# Patient Record
Sex: FEMALE | Race: WHITE | NOT HISPANIC OR LATINO | Employment: OTHER | URBAN - METROPOLITAN AREA
[De-identification: names, ages, dates, MRNs, and addresses within clinical notes are randomized per-mention and may not be internally consistent; named-entity substitution may affect disease eponyms.]

---

## 2023-10-19 ENCOUNTER — APPOINTMENT (OUTPATIENT)
Dept: RADIOLOGY | Facility: HOSPITAL | Age: 78
DRG: 885 | End: 2023-10-19
Payer: MEDICARE

## 2023-10-19 ENCOUNTER — HOSPITAL ENCOUNTER (INPATIENT)
Facility: HOSPITAL | Age: 78
LOS: 3 days | Discharge: PSYCHIATRIC OR SUBSTANCE USE FACILITY | DRG: 885 | End: 2023-10-23
Attending: EMERGENCY MEDICINE | Admitting: INTERNAL MEDICINE
Payer: MEDICARE

## 2023-10-19 DIAGNOSIS — R41.82 ALTERED MENTAL STATUS, UNSPECIFIED ALTERED MENTAL STATUS TYPE: Primary | ICD-10-CM

## 2023-10-19 DIAGNOSIS — R79.89 ELEVATED TROPONIN: ICD-10-CM

## 2023-10-19 DIAGNOSIS — R94.31 ABNORMAL EKG: ICD-10-CM

## 2023-10-19 DIAGNOSIS — R06.02 SHORTNESS OF BREATH ON EXERTION: Chronic | ICD-10-CM

## 2023-10-19 LAB
ALBUMIN SERPL BCP-MCNC: 5 G/DL (ref 3.4–5)
ALP SERPL-CCNC: 81 U/L (ref 33–136)
ALT SERPL W P-5'-P-CCNC: 15 U/L (ref 7–45)
AMPHETAMINES UR QL SCN: NORMAL
ANION GAP SERPL CALC-SCNC: 18 MMOL/L (ref 10–20)
APAP SERPL-MCNC: <10 UG/ML
APPEARANCE UR: ABNORMAL
AST SERPL W P-5'-P-CCNC: 20 U/L (ref 9–39)
BARBITURATES UR QL SCN: NORMAL
BASOPHILS # BLD AUTO: 0.02 X10*3/UL (ref 0–0.1)
BASOPHILS NFR BLD AUTO: 0.3 %
BENZODIAZ UR QL SCN: NORMAL
BILIRUB SERPL-MCNC: 1 MG/DL (ref 0–1.2)
BILIRUB UR STRIP.AUTO-MCNC: NEGATIVE MG/DL
BUN SERPL-MCNC: 17 MG/DL (ref 6–23)
BZE UR QL SCN: NORMAL
CALCIUM SERPL-MCNC: 9.8 MG/DL (ref 8.6–10.3)
CANNABINOIDS UR QL SCN: NORMAL
CARDIAC TROPONIN I PNL SERPL HS: 33 NG/L (ref 0–13)
CARDIAC TROPONIN I PNL SERPL HS: 55 NG/L (ref 0–13)
CHLORIDE SERPL-SCNC: 100 MMOL/L (ref 98–107)
CO2 SERPL-SCNC: 25 MMOL/L (ref 21–32)
COLOR UR: YELLOW
CREAT SERPL-MCNC: 1.03 MG/DL (ref 0.5–1.05)
EOSINOPHIL # BLD AUTO: 0.02 X10*3/UL (ref 0–0.4)
EOSINOPHIL NFR BLD AUTO: 0.3 %
ERYTHROCYTE [DISTWIDTH] IN BLOOD BY AUTOMATED COUNT: 12 % (ref 11.5–14.5)
ETHANOL SERPL-MCNC: <10 MG/DL
FENTANYL+NORFENTANYL UR QL SCN: NORMAL
GFR SERPL CREATININE-BSD FRML MDRD: 55 ML/MIN/1.73M*2
GLUCOSE SERPL-MCNC: 122 MG/DL (ref 74–99)
GLUCOSE UR STRIP.AUTO-MCNC: NEGATIVE MG/DL
HCT VFR BLD AUTO: 44.2 % (ref 36–46)
HGB BLD-MCNC: 14.5 G/DL (ref 12–16)
HYALINE CASTS #/AREA URNS AUTO: ABNORMAL /LPF
IMM GRANULOCYTES # BLD AUTO: 0.01 X10*3/UL (ref 0–0.5)
IMM GRANULOCYTES NFR BLD AUTO: 0.1 % (ref 0–0.9)
KETONES UR STRIP.AUTO-MCNC: ABNORMAL MG/DL
LEUKOCYTE ESTERASE UR QL STRIP.AUTO: NEGATIVE
LYMPHOCYTES # BLD AUTO: 1.17 X10*3/UL (ref 0.8–3)
LYMPHOCYTES NFR BLD AUTO: 16.3 %
MAGNESIUM SERPL-MCNC: 2.39 MG/DL (ref 1.6–2.4)
MCH RBC QN AUTO: 27.8 PG (ref 26–34)
MCHC RBC AUTO-ENTMCNC: 32.8 G/DL (ref 32–36)
MCV RBC AUTO: 85 FL (ref 80–100)
MONOCYTES # BLD AUTO: 0.52 X10*3/UL (ref 0.05–0.8)
MONOCYTES NFR BLD AUTO: 7.3 %
NEUTROPHILS # BLD AUTO: 5.42 X10*3/UL (ref 1.6–5.5)
NEUTROPHILS NFR BLD AUTO: 75.7 %
NITRITE UR QL STRIP.AUTO: NEGATIVE
NRBC BLD-RTO: 0 /100 WBCS (ref 0–0)
OPIATES UR QL SCN: NORMAL
OXYCODONE+OXYMORPHONE UR QL SCN: NORMAL
PCP UR QL SCN: NORMAL
PH UR STRIP.AUTO: 6 [PH]
PLATELET # BLD AUTO: 198 X10*3/UL (ref 150–450)
PMV BLD AUTO: 9.7 FL (ref 7.5–11.5)
POTASSIUM SERPL-SCNC: 3.6 MMOL/L (ref 3.5–5.3)
PROT SERPL-MCNC: 7 G/DL (ref 6.4–8.2)
PROT UR STRIP.AUTO-MCNC: ABNORMAL MG/DL
RBC # BLD AUTO: 5.22 X10*6/UL (ref 4–5.2)
RBC # UR STRIP.AUTO: ABNORMAL /UL
RBC #/AREA URNS AUTO: >20 /HPF
SALICYLATES SERPL-MCNC: <3 MG/DL
SODIUM SERPL-SCNC: 139 MMOL/L (ref 136–145)
SP GR UR STRIP.AUTO: 1.02
TSH SERPL-ACNC: 3.68 MIU/L (ref 0.44–3.98)
UROBILINOGEN UR STRIP.AUTO-MCNC: <2 MG/DL
WBC # BLD AUTO: 7.2 X10*3/UL (ref 4.4–11.3)
WBC #/AREA URNS AUTO: ABNORMAL /HPF

## 2023-10-19 PROCEDURE — 2500000004 HC RX 250 GENERAL PHARMACY W/ HCPCS (ALT 636 FOR OP/ED): Performed by: EMERGENCY MEDICINE

## 2023-10-19 PROCEDURE — 71045 X-RAY EXAM CHEST 1 VIEW: CPT | Mod: FOREIGN READ | Performed by: RADIOLOGY

## 2023-10-19 PROCEDURE — 80053 COMPREHEN METABOLIC PANEL: CPT | Performed by: PHYSICIAN ASSISTANT

## 2023-10-19 PROCEDURE — 81001 URINALYSIS AUTO W/SCOPE: CPT | Performed by: PHYSICIAN ASSISTANT

## 2023-10-19 PROCEDURE — 70450 CT HEAD/BRAIN W/O DYE: CPT | Performed by: RADIOLOGY

## 2023-10-19 PROCEDURE — 84484 ASSAY OF TROPONIN QUANT: CPT | Performed by: PHYSICIAN ASSISTANT

## 2023-10-19 PROCEDURE — 80307 DRUG TEST PRSMV CHEM ANLYZR: CPT | Performed by: PHYSICIAN ASSISTANT

## 2023-10-19 PROCEDURE — 85025 COMPLETE CBC W/AUTO DIFF WBC: CPT | Performed by: PHYSICIAN ASSISTANT

## 2023-10-19 PROCEDURE — 36415 COLL VENOUS BLD VENIPUNCTURE: CPT | Performed by: PHYSICIAN ASSISTANT

## 2023-10-19 PROCEDURE — 84443 ASSAY THYROID STIM HORMONE: CPT | Performed by: PHYSICIAN ASSISTANT

## 2023-10-19 PROCEDURE — 83735 ASSAY OF MAGNESIUM: CPT | Performed by: EMERGENCY MEDICINE

## 2023-10-19 PROCEDURE — 99285 EMERGENCY DEPT VISIT HI MDM: CPT | Mod: 25 | Performed by: EMERGENCY MEDICINE

## 2023-10-19 PROCEDURE — 71045 X-RAY EXAM CHEST 1 VIEW: CPT | Mod: FR

## 2023-10-19 PROCEDURE — 36415 COLL VENOUS BLD VENIPUNCTURE: CPT

## 2023-10-19 PROCEDURE — 70450 CT HEAD/BRAIN W/O DYE: CPT | Mod: MG

## 2023-10-19 PROCEDURE — 99285 EMERGENCY DEPT VISIT HI MDM: CPT | Performed by: PHYSICIAN ASSISTANT

## 2023-10-19 PROCEDURE — 84484 ASSAY OF TROPONIN QUANT: CPT

## 2023-10-19 PROCEDURE — 2500000004 HC RX 250 GENERAL PHARMACY W/ HCPCS (ALT 636 FOR OP/ED)

## 2023-10-19 PROCEDURE — 80320 DRUG SCREEN QUANTALCOHOLS: CPT | Performed by: PHYSICIAN ASSISTANT

## 2023-10-19 PROCEDURE — 93010 ELECTROCARDIOGRAM REPORT: CPT | Performed by: INTERNAL MEDICINE

## 2023-10-19 PROCEDURE — 96372 THER/PROPH/DIAG INJ SC/IM: CPT

## 2023-10-19 PROCEDURE — 93010 ELECTROCARDIOGRAM REPORT: CPT | Performed by: EMERGENCY MEDICINE

## 2023-10-19 RX ORDER — LORAZEPAM 2 MG/ML
INJECTION INTRAMUSCULAR
Status: COMPLETED
Start: 2023-10-19 | End: 2023-10-19

## 2023-10-19 RX ORDER — HALOPERIDOL 5 MG/ML
2.5 INJECTION INTRAMUSCULAR ONCE
Status: COMPLETED | OUTPATIENT
Start: 2023-10-19 | End: 2023-10-19

## 2023-10-19 RX ORDER — HALOPERIDOL 5 MG/ML
2 INJECTION INTRAMUSCULAR ONCE
Status: DISCONTINUED | OUTPATIENT
Start: 2023-10-19 | End: 2023-10-19

## 2023-10-19 RX ORDER — NAPROXEN SODIUM 220 MG/1
324 TABLET, FILM COATED ORAL ONCE
Status: DISCONTINUED | OUTPATIENT
Start: 2023-10-19 | End: 2023-10-20

## 2023-10-19 RX ORDER — LORAZEPAM 1 MG/1
2 TABLET ORAL ONCE
Status: DISCONTINUED | OUTPATIENT
Start: 2023-10-19 | End: 2023-10-19

## 2023-10-19 RX ORDER — DIPHENHYDRAMINE HYDROCHLORIDE 50 MG/ML
12.5 INJECTION INTRAMUSCULAR; INTRAVENOUS ONCE
Status: DISCONTINUED | OUTPATIENT
Start: 2023-10-19 | End: 2023-10-19

## 2023-10-19 RX ORDER — HALOPERIDOL 5 MG/ML
2.5 INJECTION INTRAMUSCULAR ONCE
Status: DISCONTINUED | OUTPATIENT
Start: 2023-10-19 | End: 2023-10-19

## 2023-10-19 RX ORDER — HALOPERIDOL 5 MG/ML
INJECTION INTRAMUSCULAR
Status: COMPLETED
Start: 2023-10-19 | End: 2023-10-19

## 2023-10-19 RX ORDER — DIPHENHYDRAMINE HYDROCHLORIDE 50 MG/ML
25 INJECTION INTRAMUSCULAR; INTRAVENOUS ONCE
Status: COMPLETED | OUTPATIENT
Start: 2023-10-19 | End: 2023-10-19

## 2023-10-19 RX ORDER — LORAZEPAM 2 MG/ML
2 INJECTION INTRAMUSCULAR ONCE
Status: COMPLETED | OUTPATIENT
Start: 2023-10-19 | End: 2023-10-19

## 2023-10-19 RX ADMIN — HALOPERIDOL 2.5 MG: 5 INJECTION INTRAMUSCULAR at 18:38

## 2023-10-19 RX ADMIN — DIPHENHYDRAMINE HYDROCHLORIDE 25 MG: 50 INJECTION, SOLUTION INTRAMUSCULAR; INTRAVENOUS at 20:13

## 2023-10-19 RX ADMIN — LORAZEPAM 2 MG: 2 INJECTION INTRAMUSCULAR; INTRAVENOUS at 20:12

## 2023-10-19 RX ADMIN — LORAZEPAM 2 MG: 2 INJECTION INTRAMUSCULAR at 20:12

## 2023-10-19 RX ADMIN — HALOPERIDOL LACTATE 2.5 MG: 5 INJECTION, SOLUTION INTRAMUSCULAR at 18:38

## 2023-10-19 SDOH — HEALTH STABILITY: MENTAL HEALTH: SLEEP PATTERN: UNABLE TO ASSESS

## 2023-10-19 SDOH — SOCIAL STABILITY: SOCIAL INSECURITY: FAMILY BEHAVIORS: ANXIOUS

## 2023-10-19 SDOH — SOCIAL STABILITY: SOCIAL NETWORK: EMOTIONAL SUPPORT GIVEN: REASSURE

## 2023-10-19 SDOH — HEALTH STABILITY: MENTAL HEALTH: BEHAVIORS/MOOD: AGITATED;ANXIOUS;DEFIANT;DELUSIONS;HALLUCINATIONS;HOSTILE;IMPULSIVE;INAPPROPRIATE;IRRITABLE;RESTLESS

## 2023-10-19 SDOH — HEALTH STABILITY: MENTAL HEALTH: CONTENT: BLAMING OTHERS;OBSESSIONS

## 2023-10-19 SDOH — HEALTH STABILITY: MENTAL HEALTH: HAVE YOU EVER DONE ANYTHING, STARTED TO DO ANYTHING, OR PREPARED TO DO ANYTHING TO END YOUR LIFE?: NO

## 2023-10-19 SDOH — HEALTH STABILITY: MENTAL HEALTH: BEHAVIORS/MOOD: ANGRY;AGITATED;FLIGHT OF IDEAS;HYPER-VERBAL;INAPPROPRIATE;IMPULSIVE;IRRITABLE

## 2023-10-19 SDOH — SOCIAL STABILITY: SOCIAL NETWORK: PARENT/GUARDIAN/SIGNIFICANT OTHER INVOLVEMENT: SPORADIC INVOLVEMENT

## 2023-10-19 SDOH — HEALTH STABILITY: MENTAL HEALTH: BEHAVIORS/MOOD: FLAT AFFECT;SLEEPING

## 2023-10-19 SDOH — HEALTH STABILITY: MENTAL HEALTH: BEHAVIORS/MOOD: SLEEPING

## 2023-10-19 SDOH — HEALTH STABILITY: MENTAL HEALTH
BEHAVIORS/MOOD: AGITATED;AGGRESSIVE VERBALLY, OTHERS;DELUSIONS;HALLUCINATIONS;GUARDED;HOSTILE;HYPER-VERBAL;IMPULSIVE;PARANOID;RESTLESS

## 2023-10-19 SDOH — HEALTH STABILITY: MENTAL HEALTH: HAVE YOU ACTUALLY HAD ANY THOUGHTS OF KILLING YOURSELF?: NO

## 2023-10-19 SDOH — HEALTH STABILITY: MENTAL HEALTH: NEEDS EXPRESSED: DENIES

## 2023-10-19 SDOH — HEALTH STABILITY: MENTAL HEALTH: SUICIDE ASSESSMENT: ADULT (C-SSRS)

## 2023-10-19 SDOH — HEALTH STABILITY: MENTAL HEALTH: HAVE YOU WISHED YOU WERE DEAD OR WISHED YOU COULD GO TO SLEEP AND NOT WAKE UP?: NO

## 2023-10-19 SDOH — HEALTH STABILITY: MENTAL HEALTH: DELUSIONS: CONTROLLING

## 2023-10-19 ASSESSMENT — PAIN - FUNCTIONAL ASSESSMENT: PAIN_FUNCTIONAL_ASSESSMENT: 0-10

## 2023-10-19 ASSESSMENT — COLUMBIA-SUICIDE SEVERITY RATING SCALE - C-SSRS
1. IN THE PAST MONTH, HAVE YOU WISHED YOU WERE DEAD OR WISHED YOU COULD GO TO SLEEP AND NOT WAKE UP?: NO
6. HAVE YOU EVER DONE ANYTHING, STARTED TO DO ANYTHING, OR PREPARED TO DO ANYTHING TO END YOUR LIFE?: NO
2. HAVE YOU ACTUALLY HAD ANY THOUGHTS OF KILLING YOURSELF?: NO

## 2023-10-19 ASSESSMENT — LIFESTYLE VARIABLES
REASON UNABLE TO ASSESS: YES
REASON UNABLE TO ASSESS: YES

## 2023-10-19 NOTE — ED PROVIDER NOTES

## 2023-10-19 NOTE — ED PROVIDER NOTES
"HPI   Chief Complaint   Patient presents with    Altered Mental Status     Per EMS pt called the leasing office disoriented, normally alert and oriented. Pt states \"I know I'm not delusional I'm Kar's mother.\" Flight of ideas Unable to de-escalate pt, continuously shouting.        Patient is a 78-year-old female with history of stage III CKD, diverticulosis, JONA, hypertension, lichen sclerosis, major depressive disorder, recurrent UTIs, who is on Effexor and bupropion who presents with altered mental status and acute psychosis.  Apparently her leasing office at her apartment where she lives alone called the squad after patient called their office and was behaving erratically, they are familiar with this patient and she does not normally act like this.  Patient is a difficult historian, she is very tangential, has flight of ideas, is very hyper fixated on somebody named Kar who she tells me is her son but then states that he is 80 years old, then tells me it is her .  In her contact information Joel is listed as her .  She is very hyper fixated on whom he is dating and having sexual relations with, she is very difficult to obtain a history from.  She denies any chest pain, shortness of breath, abdominal pain, burning frequency or urgency with urination, denies headache or head trauma.  She is alert and oriented to self, she believes that we are at [facility] \"where she graduated from\".  She denies ingesting too much of her medications or any drugs or alcohol.                            Nirali Coma Scale Score: 14                  Patient History   Past Medical History:   Diagnosis Date    Hyperlipidemia, unspecified 09/23/2015    Dyslipidemia    Overweight 09/23/2015    Overweight and obesity    Personal history of other diseases of the circulatory system 09/23/2015    History of hypertension    Personal history of other diseases of the digestive system 09/23/2015    History of esophageal " reflux    Personal history of other endocrine, nutritional and metabolic disease 12/15/2015    History of obesity    Stress incontinence (female) (male) 09/23/2015    Stress incontinence, female     No past surgical history on file.  No family history on file.  Social History     Tobacco Use    Smoking status: Not on file    Smokeless tobacco: Not on file   Substance Use Topics    Alcohol use: Not on file    Drug use: Not on file       Physical Exam   ED Triage Vitals [10/19/23 1810]   Temp Heart Rate Resp BP   36.7 °C (98.1 °F) 60 20 142/88      SpO2 Temp Source Heart Rate Source Patient Position   94 % Temporal -- --      BP Location FiO2 (%)     -- --       Physical Exam  Vitals and nursing note reviewed.   Constitutional:       General: She is not in acute distress.     Appearance: Normal appearance. She is not toxic-appearing.      Comments: Erratic, yelling loudly in ED, tangential, sexually hyperfixated   HENT:      Head: Normocephalic and atraumatic.      Nose: Nose normal.   Eyes:      General: No visual field deficit.     Extraocular Movements: Extraocular movements intact.      Pupils: Pupils are equal, round, and reactive to light.   Cardiovascular:      Rate and Rhythm: Normal rate and regular rhythm.   Pulmonary:      Effort: Pulmonary effort is normal.      Breath sounds: Normal breath sounds.   Abdominal:      Palpations: Abdomen is soft.      Tenderness: There is no abdominal tenderness. There is no guarding.   Musculoskeletal:         General: Normal range of motion.      Cervical back: Normal range of motion and neck supple.   Skin:     General: Skin is warm and dry.   Neurological:      General: No focal deficit present.      Mental Status: She is alert and oriented to person, place, and time.      GCS: GCS eye subscore is 4. GCS verbal subscore is 5. GCS motor subscore is 6.      Cranial Nerves: No cranial nerve deficit, dysarthria or facial asymmetry.      Sensory: No sensory deficit.    Psychiatric:         Mood and Affect: Mood normal.         Thought Content: Thought content normal.       Labs Reviewed   CBC WITH AUTO DIFFERENTIAL - Abnormal       Result Value    WBC 7.2      nRBC 0.0      RBC 5.22 (*)     Hemoglobin 14.5      Hematocrit 44.2      MCV 85      MCH 27.8      MCHC 32.8      RDW 12.0      Platelets 198      MPV 9.7      Neutrophils % 75.7      Immature Granulocytes %, Automated 0.1      Lymphocytes % 16.3      Monocytes % 7.3      Eosinophils % 0.3      Basophils % 0.3      Neutrophils Absolute 5.42      Immature Granulocytes Absolute, Automated 0.01      Lymphocytes Absolute 1.17      Monocytes Absolute 0.52      Eosinophils Absolute 0.02      Basophils Absolute 0.02     COMPREHENSIVE METABOLIC PANEL - Abnormal    Glucose 122 (*)     Sodium 139      Potassium 3.6      Chloride 100      Bicarbonate 25      Anion Gap 18      Urea Nitrogen 17      Creatinine 1.03      eGFR 55 (*)     Calcium 9.8      Albumin 5.0      Alkaline Phosphatase 81      Total Protein 7.0      AST 20      Bilirubin, Total 1.0      ALT 15     TSH WITH REFLEX TO FREE T4 IF ABNORMAL - Normal    Thyroid Stimulating Hormone 3.68      Narrative:     TSH testing is performed using different testing methodology at [facility] than at other [facility]. Direct result comparisons should only be made within the same method.     ACUTE TOXICOLOGY PANEL, BLOOD - Normal    Acetaminophen <10.0      Salicylate  <3      Alcohol <10     MAGNESIUM - Normal    Magnesium 2.39     URINALYSIS WITH REFLEX MICROSCOPIC AND CULTURE    Narrative:     The following orders were created for panel order Urinalysis with Reflex Microscopic and Culture.  Procedure                               Abnormality         Status                     ---------                               -----------         ------                     Urinalysis with Reflex M...[[id number]]                                                 Extra Urine Murphy  Tube[[id number]]                                                         Please view results for these tests on the individual orders.   DRUG SCREEN,URINE   URINALYSIS WITH REFLEX MICROSCOPIC AND CULTURE   EXTRA URINE GRAY TUBE   TROPONIN SERIES- (INITIAL, 1 HR)    Narrative:     The following orders were created for panel order Troponin Series, (0, 1 HR).  Procedure                               Abnormality         Status                     ---------                               -----------         ------                     Troponin I, High Sensiti...[[id number]]                      In process                   Please view results for these tests on the individual orders.   SERIAL TROPONIN-INITIAL   POCT GLUCOSE METER     XR chest 1 view   Final Result   No findings of an acute cardiopulmonary process.   Signed by Reymundo Fierro MD      CT head wo IV contrast   Final Result   No acute intracranial hemorrhage or mass effect.        Mild nonspecific white matter changes and parenchymal volume loss.             MACRO:   None.        Signed by: Janna Hayes 10/19/2023 7:13 PM   Dictation workstation:   [id number]            ED Course & MDM   ED Course as of 10/19/23 2117   Thu Oct 19, 2023   1814 Attempted to contact patient's  Joel for collateral information, left VM after no answer. [MK]      ED Course User Index  [MK] Sylwia Eddy PA-C       Medical Decision Making    MDM: Patient is a 78-year-old female who presents with altered mental status and acute psychosis, per review of her records and her PCPs most recent office visit, it does appear that she suffers from some depression but there is no indication that patient acts like this at baseline, I attempted to call Joel, her  and was unable to speak to him, left a voicemail requesting a call back.  I do have concerns for infection, intracranial process, UTI, etc. and for this reason altered mental status work-up was initiated, we did  obtain an EKG which showed a mild QTc prolongation, Haldol was given prior to EKG being done, at which point we discontinued any additional QT prolonging medications, cardiac monitor was ordered and patient was given 12.5 of Benadryl and 2 of Ativan given that she was disturbing others in the ED with her yet loud yelling and hyperfixation on sexual topics.  Patient CMP showed a glucose of 122, normal creatinine, GFR 55, TSH within normal limits, magnesium 2.39, tox panel negative, CBC without leukocytosis, hemoglobin 14.5, EKG interpreted by ED attending, did show mild QTc prolongation, CT head is negative chest x-ray negative, patient acutely agitated in the ED, will initially was given Haldol prior to obtaining EKG, at which point Ativan and Benadryl was given.    EKG was read immediately after being performed,, normal sinus rhythm with a rate of 98, she did have a QTc prolongation of 487, there are T wave inversions in V1 V2 V3 in the setting of a right bundle branch block, left axis deviation, there is no prior EKG for comparison, patient not complaining of any chest pain or shortness of breath but troponins were added on.  Troponin and urinalysis currently pending, high clinical suspicion of a UTI causing her symptoms, at her age without a history of psychosis, likely metabolic/medical in nature.  Anticipate admission, patient will be signed out to incoming resident Anton Berger.          Procedure  Procedures     Sylwia Eddy PA-C  10/19/23 9153

## 2023-10-19 NOTE — PROGRESS NOTES

## 2023-10-20 ENCOUNTER — APPOINTMENT (OUTPATIENT)
Dept: CARDIOLOGY | Facility: HOSPITAL | Age: 78
DRG: 885 | End: 2023-10-20
Payer: MEDICARE

## 2023-10-20 PROBLEM — R06.02 SHORTNESS OF BREATH ON EXERTION: Chronic | Status: ACTIVE | Noted: 2023-10-20

## 2023-10-20 PROBLEM — R41.82 ALTERED MENTAL STATUS, UNSPECIFIED ALTERED MENTAL STATUS TYPE: Status: ACTIVE | Noted: 2023-10-20

## 2023-10-20 PROBLEM — R79.89 ELEVATED TROPONIN: Status: ACTIVE | Noted: 2023-10-20

## 2023-10-20 PROBLEM — R94.31 ABNORMAL EKG: Status: ACTIVE | Noted: 2023-10-20

## 2023-10-20 LAB
AMMONIA PLAS-SCNC: 32 UMOL/L (ref 16–53)
ANION GAP SERPL CALC-SCNC: 14 MMOL/L (ref 10–20)
ATRIAL RATE: 95 BPM
BUN SERPL-MCNC: 24 MG/DL (ref 6–23)
CALCIUM SERPL-MCNC: 9.8 MG/DL (ref 8.6–10.3)
CARDIAC TROPONIN I PNL SERPL HS: 45 NG/L (ref 0–13)
CHLORIDE SERPL-SCNC: 103 MMOL/L (ref 98–107)
CO2 SERPL-SCNC: 26 MMOL/L (ref 21–32)
CREAT SERPL-MCNC: 1.07 MG/DL (ref 0.5–1.05)
ERYTHROCYTE [DISTWIDTH] IN BLOOD BY AUTOMATED COUNT: 12.3 % (ref 11.5–14.5)
GFR SERPL CREATININE-BSD FRML MDRD: 53 ML/MIN/1.73M*2
GLUCOSE SERPL-MCNC: 109 MG/DL (ref 74–99)
HCT VFR BLD AUTO: 43.1 % (ref 36–46)
HGB BLD-MCNC: 14 G/DL (ref 12–16)
HOLD SPECIMEN: NORMAL
MAGNESIUM SERPL-MCNC: 2.49 MG/DL (ref 1.6–2.4)
MCH RBC QN AUTO: 27.7 PG (ref 26–34)
MCHC RBC AUTO-ENTMCNC: 32.5 G/DL (ref 32–36)
MCV RBC AUTO: 85 FL (ref 80–100)
NRBC BLD-RTO: 0 /100 WBCS (ref 0–0)
P AXIS: 41 DEGREES
P OFFSET: 202 MS
P ONSET: 128 MS
PLATELET # BLD AUTO: 229 X10*3/UL (ref 150–450)
PMV BLD AUTO: 9.6 FL (ref 7.5–11.5)
POTASSIUM SERPL-SCNC: 3.9 MMOL/L (ref 3.5–5.3)
PR INTERVAL: 162 MS
Q ONSET: 209 MS
QRS COUNT: 15 BEATS
QRS DURATION: 110 MS
QT INTERVAL: 390 MS
QTC CALCULATION(BAZETT): 490 MS
QTC FREDERICIA: 454 MS
R AXIS: -65 DEGREES
RBC # BLD AUTO: 5.05 X10*6/UL (ref 4–5.2)
SODIUM SERPL-SCNC: 139 MMOL/L (ref 136–145)
T AXIS: 3 DEGREES
T OFFSET: 404 MS
VENTRICULAR RATE: 95 BPM
WBC # BLD AUTO: 8.2 X10*3/UL (ref 4.4–11.3)

## 2023-10-20 PROCEDURE — 36415 COLL VENOUS BLD VENIPUNCTURE: CPT | Performed by: PHYSICIAN ASSISTANT

## 2023-10-20 PROCEDURE — 85027 COMPLETE CBC AUTOMATED: CPT | Performed by: NURSE PRACTITIONER

## 2023-10-20 PROCEDURE — 80048 BASIC METABOLIC PNL TOTAL CA: CPT | Performed by: NURSE PRACTITIONER

## 2023-10-20 PROCEDURE — 93010 ELECTROCARDIOGRAM REPORT: CPT | Performed by: INTERNAL MEDICINE

## 2023-10-20 PROCEDURE — 99232 SBSQ HOSP IP/OBS MODERATE 35: CPT | Performed by: NURSE PRACTITIONER

## 2023-10-20 PROCEDURE — 84484 ASSAY OF TROPONIN QUANT: CPT | Performed by: PHYSICIAN ASSISTANT

## 2023-10-20 PROCEDURE — 99221 1ST HOSP IP/OBS SF/LOW 40: CPT | Performed by: PSYCHIATRY & NEUROLOGY

## 2023-10-20 PROCEDURE — 93005 ELECTROCARDIOGRAM TRACING: CPT

## 2023-10-20 PROCEDURE — 99222 1ST HOSP IP/OBS MODERATE 55: CPT | Performed by: INTERNAL MEDICINE

## 2023-10-20 PROCEDURE — 2500000004 HC RX 250 GENERAL PHARMACY W/ HCPCS (ALT 636 FOR OP/ED): Performed by: NURSE PRACTITIONER

## 2023-10-20 PROCEDURE — 2500000001 HC RX 250 WO HCPCS SELF ADMINISTERED DRUGS (ALT 637 FOR MEDICARE OP): Performed by: PHYSICIAN ASSISTANT

## 2023-10-20 PROCEDURE — 2500000001 HC RX 250 WO HCPCS SELF ADMINISTERED DRUGS (ALT 637 FOR MEDICARE OP): Performed by: NURSE PRACTITIONER

## 2023-10-20 PROCEDURE — 2060000001 HC INTERMEDIATE ICU ROOM DAILY

## 2023-10-20 PROCEDURE — 82140 ASSAY OF AMMONIA: CPT | Performed by: PHYSICIAN ASSISTANT

## 2023-10-20 PROCEDURE — 83735 ASSAY OF MAGNESIUM: CPT | Performed by: NURSE PRACTITIONER

## 2023-10-20 PROCEDURE — 36415 COLL VENOUS BLD VENIPUNCTURE: CPT | Performed by: NURSE PRACTITIONER

## 2023-10-20 RX ORDER — ACETAMINOPHEN 325 MG/1
650 TABLET ORAL EVERY 4 HOURS PRN
Status: DISCONTINUED | OUTPATIENT
Start: 2023-10-20 | End: 2023-10-24 | Stop reason: HOSPADM

## 2023-10-20 RX ORDER — ICOSAPENT ETHYL 1 G/1
1 CAPSULE ORAL DAILY
Status: DISCONTINUED | OUTPATIENT
Start: 2023-10-20 | End: 2023-10-24 | Stop reason: HOSPADM

## 2023-10-20 RX ORDER — METOPROLOL SUCCINATE 25 MG/1
25 TABLET, EXTENDED RELEASE ORAL 2 TIMES DAILY
COMMUNITY

## 2023-10-20 RX ORDER — AMLODIPINE BESYLATE 10 MG/1
10 TABLET ORAL DAILY
COMMUNITY

## 2023-10-20 RX ORDER — ESTRADIOL 0.1 MG/G
2 CREAM VAGINAL 2 TIMES WEEKLY
COMMUNITY

## 2023-10-20 RX ORDER — ENOXAPARIN SODIUM 100 MG/ML
40 INJECTION SUBCUTANEOUS DAILY
Status: DISCONTINUED | OUTPATIENT
Start: 2023-10-20 | End: 2023-10-24 | Stop reason: HOSPADM

## 2023-10-20 RX ORDER — ROSUVASTATIN CALCIUM 10 MG/1
10 TABLET, COATED ORAL DAILY
COMMUNITY

## 2023-10-20 RX ORDER — POLYETHYLENE GLYCOL 3350 17 G/17G
17 POWDER, FOR SOLUTION ORAL DAILY
Status: DISCONTINUED | OUTPATIENT
Start: 2023-10-20 | End: 2023-10-24 | Stop reason: HOSPADM

## 2023-10-20 RX ORDER — BUPROPION HYDROCHLORIDE 150 MG/1
300 TABLET ORAL DAILY
Status: DISCONTINUED | OUTPATIENT
Start: 2023-10-20 | End: 2023-10-21

## 2023-10-20 RX ORDER — GLUCOSAM/CHONDRO/HERB 149/HYAL 750-100 MG
1 TABLET ORAL DAILY
COMMUNITY

## 2023-10-20 RX ORDER — TALC
3 POWDER (GRAM) TOPICAL DAILY
Status: DISCONTINUED | OUTPATIENT
Start: 2023-10-20 | End: 2023-10-24 | Stop reason: HOSPADM

## 2023-10-20 RX ORDER — ACETAMINOPHEN 160 MG/5ML
650 SOLUTION ORAL EVERY 4 HOURS PRN
Status: DISCONTINUED | OUTPATIENT
Start: 2023-10-20 | End: 2023-10-24 | Stop reason: HOSPADM

## 2023-10-20 RX ORDER — AMLODIPINE BESYLATE 10 MG/1
10 TABLET ORAL DAILY
Status: DISCONTINUED | OUTPATIENT
Start: 2023-10-20 | End: 2023-10-24 | Stop reason: HOSPADM

## 2023-10-20 RX ORDER — METOPROLOL SUCCINATE 25 MG/1
25 TABLET, EXTENDED RELEASE ORAL 2 TIMES DAILY
Status: DISCONTINUED | OUTPATIENT
Start: 2023-10-20 | End: 2023-10-24 | Stop reason: HOSPADM

## 2023-10-20 RX ORDER — ACETAMINOPHEN 650 MG/1
650 SUPPOSITORY RECTAL EVERY 4 HOURS PRN
Status: DISCONTINUED | OUTPATIENT
Start: 2023-10-20 | End: 2023-10-24 | Stop reason: HOSPADM

## 2023-10-20 RX ORDER — ROSUVASTATIN CALCIUM 10 MG/1
10 TABLET, COATED ORAL NIGHTLY
Status: DISCONTINUED | OUTPATIENT
Start: 2023-10-20 | End: 2023-10-24 | Stop reason: HOSPADM

## 2023-10-20 RX ORDER — CRANBERRY FRUIT 450 MG
1 TABLET ORAL DAILY
COMMUNITY

## 2023-10-20 RX ORDER — VENLAFAXINE HYDROCHLORIDE 37.5 MG/1
37.5 CAPSULE, EXTENDED RELEASE ORAL DAILY
COMMUNITY
End: 2023-10-24 | Stop reason: HOSPADM

## 2023-10-20 RX ORDER — VENLAFAXINE HYDROCHLORIDE 37.5 MG/1
37.5 CAPSULE, EXTENDED RELEASE ORAL DAILY
Status: DISCONTINUED | OUTPATIENT
Start: 2023-10-20 | End: 2023-10-21

## 2023-10-20 RX ORDER — BUPROPION HYDROCHLORIDE 300 MG/1
300 TABLET ORAL DAILY
COMMUNITY
End: 2023-10-24 | Stop reason: HOSPADM

## 2023-10-20 RX ADMIN — METOPROLOL SUCCINATE 25 MG: 25 TABLET, EXTENDED RELEASE ORAL at 22:10

## 2023-10-20 RX ADMIN — AMLODIPINE BESYLATE 10 MG: 10 TABLET ORAL at 22:09

## 2023-10-20 RX ADMIN — BUPROPION HYDROCHLORIDE 300 MG: 150 TABLET, FILM COATED, EXTENDED RELEASE ORAL at 22:10

## 2023-10-20 RX ADMIN — ROSUVASTATIN CALCIUM 10 MG: 10 TABLET, FILM COATED ORAL at 22:12

## 2023-10-20 RX ADMIN — Medication 3 MG: at 22:10

## 2023-10-20 SDOH — SOCIAL STABILITY: SOCIAL INSECURITY: POSSIBLE ABUSE REPORTED TO:: OTHER (COMMENT)

## 2023-10-20 SDOH — SOCIAL STABILITY: SOCIAL NETWORK: EMOTIONAL SUPPORT GIVEN: REASSURE

## 2023-10-20 SDOH — SOCIAL STABILITY: SOCIAL INSECURITY: FAMILY BEHAVIORS: OTHER (COMMENT);UNABLE TO ASSESS

## 2023-10-20 SDOH — SOCIAL STABILITY: SOCIAL NETWORK: PARENT/GUARDIAN/SIGNIFICANT OTHER INVOLVEMENT: SPORADIC INVOLVEMENT

## 2023-10-20 SDOH — SOCIAL STABILITY: SOCIAL INSECURITY: HAVE YOU HAD THOUGHTS OF HARMING ANYONE ELSE?: NO

## 2023-10-20 SDOH — HEALTH STABILITY: MENTAL HEALTH: BEHAVIORS/MOOD: SLEEPING

## 2023-10-20 SDOH — SOCIAL STABILITY: SOCIAL INSECURITY: DOES ANYONE TRY TO KEEP YOU FROM HAVING/CONTACTING OTHER FRIENDS OR DOING THINGS OUTSIDE YOUR HOME?: YES

## 2023-10-20 SDOH — HEALTH STABILITY: MENTAL HEALTH: HALLUCINATION: UNABLE TO ASSESS

## 2023-10-20 SDOH — SOCIAL STABILITY: SOCIAL INSECURITY: ABUSE: ADULT

## 2023-10-20 SDOH — HEALTH STABILITY: MENTAL HEALTH: NEEDS EXPRESSED: DENIES

## 2023-10-20 SDOH — SOCIAL STABILITY: SOCIAL INSECURITY: DO YOU FEEL ANYONE HAS EXPLOITED OR TAKEN ADVANTAGE OF YOU FINANCIALLY OR OF YOUR PERSONAL PROPERTY?: NO

## 2023-10-20 SDOH — SOCIAL STABILITY: SOCIAL INSECURITY: ARE THERE ANY APPARENT SIGNS OF INJURIES/BEHAVIORS THAT COULD BE RELATED TO ABUSE/NEGLECT?: NO

## 2023-10-20 SDOH — SOCIAL STABILITY: SOCIAL INSECURITY: ARE YOU OR HAVE YOU BEEN THREATENED OR ABUSED PHYSICALLY, EMOTIONALLY, OR SEXUALLY BY ANYONE?: YES

## 2023-10-20 SDOH — SOCIAL STABILITY: SOCIAL INSECURITY: HAS ANYONE EVER THREATENED TO HURT YOUR FAMILY OR YOUR PETS?: NO

## 2023-10-20 SDOH — HEALTH STABILITY: MENTAL HEALTH: CONTENT: UNABLE TO ASSESS

## 2023-10-20 SDOH — SOCIAL STABILITY: SOCIAL NETWORK: VISITOR BEHAVIORS: UNABLE TO ASSESS

## 2023-10-20 SDOH — SOCIAL STABILITY: SOCIAL INSECURITY: DO YOU FEEL UNSAFE GOING BACK TO THE PLACE WHERE YOU ARE LIVING?: YES

## 2023-10-20 ASSESSMENT — COGNITIVE AND FUNCTIONAL STATUS - GENERAL
TURNING FROM BACK TO SIDE WHILE IN FLAT BAD: A LITTLE
DAILY ACTIVITIY SCORE: 18
MOVING FROM LYING ON BACK TO SITTING ON SIDE OF FLAT BED WITH BEDRAILS: A LITTLE
EATING MEALS: A LITTLE
DRESSING REGULAR LOWER BODY CLOTHING: A LITTLE
PATIENT BASELINE BEDBOUND: NO
PERSONAL GROOMING: A LITTLE
MOBILITY SCORE: 18
DRESSING REGULAR UPPER BODY CLOTHING: A LITTLE
MOVING TO AND FROM BED TO CHAIR: A LITTLE
HELP NEEDED FOR BATHING: A LITTLE
MOBILITY SCORE: 24
CLIMB 3 TO 5 STEPS WITH RAILING: A LITTLE
TOILETING: A LITTLE
STANDING UP FROM CHAIR USING ARMS: A LITTLE
DAILY ACTIVITIY SCORE: 24
WALKING IN HOSPITAL ROOM: A LITTLE

## 2023-10-20 ASSESSMENT — ENCOUNTER SYMPTOMS
DYSPHORIC MOOD: 1
NERVOUS/ANXIOUS: 1
AGITATION: 0
SORE THROAT: 0
SHORTNESS OF BREATH: 0
ANAL BLEEDING: 0
NAUSEA: 0
ABDOMINAL DISTENTION: 0
SLEEP DISTURBANCE: 1
FEVER: 0
HALLUCINATIONS: 0
ABDOMINAL PAIN: 0
COUGH: 0
SHORTNESS OF BREATH: 1
HEADACHES: 0
CHEST TIGHTNESS: 0
FATIGUE: 1
FREQUENCY: 0
COLOR CHANGE: 0
CHILLS: 0
EYE REDNESS: 0
CHOKING: 0
CONSTIPATION: 1
BLOOD IN STOOL: 0
ALLERGIC/IMMUNOLOGIC NEGATIVE: 1
CONSTIPATION: 0
DIAPHORESIS: 0
BRUISES/BLEEDS EASILY: 1
ACTIVITY CHANGE: 0
UNEXPECTED WEIGHT CHANGE: 0
ADENOPATHY: 0
EYES NEGATIVE: 1
DYSURIA: 1
FREQUENCY: 1
DIZZINESS: 0
WHEEZING: 0
NUMBNESS: 0
SPEECH DIFFICULTY: 0
STRIDOR: 0
ARTHRALGIAS: 1
PALPITATIONS: 0

## 2023-10-20 ASSESSMENT — LIFESTYLE VARIABLES
EVER FELT BAD OR GUILTY ABOUT YOUR DRINKING: NO
EVER HAD A DRINK FIRST THING IN THE MORNING TO STEADY YOUR NERVES TO GET RID OF A HANGOVER: NO
HAVE YOU EVER FELT YOU SHOULD CUT DOWN ON YOUR DRINKING: NO
AUDIT-C TOTAL SCORE: 0
HOW MANY STANDARD DRINKS CONTAINING ALCOHOL DO YOU HAVE ON A TYPICAL DAY: PATIENT DOES NOT DRINK
REASON UNABLE TO ASSESS: NO
SUBSTANCE_ABUSE_PAST_12_MONTHS: NO
PRESCIPTION_ABUSE_PAST_12_MONTHS: NO
HOW OFTEN DO YOU HAVE 6 OR MORE DRINKS ON ONE OCCASION: NEVER
SKIP TO QUESTIONS 9-10: 1
AUDIT-C TOTAL SCORE: 0
HOW OFTEN DO YOU HAVE A DRINK CONTAINING ALCOHOL: NEVER
HAVE PEOPLE ANNOYED YOU BY CRITICIZING YOUR DRINKING: NO

## 2023-10-20 ASSESSMENT — PATIENT HEALTH QUESTIONNAIRE - PHQ9
SUM OF ALL RESPONSES TO PHQ9 QUESTIONS 1 & 2: 3
1. LITTLE INTEREST OR PLEASURE IN DOING THINGS: SEVERAL DAYS
2. FEELING DOWN, DEPRESSED OR HOPELESS: MORE THAN HALF THE DAYS

## 2023-10-20 ASSESSMENT — ACTIVITIES OF DAILY LIVING (ADL)
ADEQUATE_TO_COMPLETE_ADL: YES
BATHING: INDEPENDENT
WALKS IN HOME: INDEPENDENT
TOILETING: INDEPENDENT
HEARING - RIGHT EAR: FUNCTIONAL
GROOMING: INDEPENDENT
JUDGMENT_ADEQUATE_SAFELY_COMPLETE_DAILY_ACTIVITIES: YES
LACK_OF_TRANSPORTATION: NO
PATIENT'S MEMORY ADEQUATE TO SAFELY COMPLETE DAILY ACTIVITIES?: YES
DRESSING YOURSELF: INDEPENDENT
HEARING - LEFT EAR: FUNCTIONAL
FEEDING YOURSELF: INDEPENDENT

## 2023-10-20 ASSESSMENT — PAIN SCALES - GENERAL
PAINLEVEL_OUTOF10: 0 - NO PAIN
PAINLEVEL_OUTOF10: 0 - NO PAIN

## 2023-10-20 NOTE — ED NOTES
Patient calm and cooperative with care; patient able to swallow water with no issues; sitter at bedside; no needs voiced at this time.     Laila Jean RN  10/20/23 1538

## 2023-10-20 NOTE — CONSULTS
Consults  Cardiology Consult Note      Date:   10/20/2023  Patient name:  Charisse Acharya  Date of admission:  10/19/2023  5:59 PM  MRN:   [id number]  YOB: 1945  Time of Consult:  7:20 PM    Consulting Cardiologist: Dr. Jordana Pérez        Primary Cardiologist:   none     Referring Provider: Dr. Devonte Wheeler      Admission Diagnosis:     Altered mental status, unspecified altered mental status type    History of Present Illness:      Charisse Acharya is a 78 y.o.  female patient who is being at the request of Dr. Wheeler for inpatient consultation of  abnormal EKG/mildly elevated troponin . She was admitted on 10/19/2023.  Previous [facility] records have been reviewed in detail.      Patient was brought by ambulance for altered mental status. The patients son is currently at the bedside and states that the patient is now back to her normal level of mentation.  Both the patient and the family are very vocal about her levels of stress and anxiety and difficulty she is experiencing with her  who is at a extended care facility which is a [facility] on hospice care.  He is supposedly creating issues at his extended care facility and she is having difficulty dealing with that as he is calling her multiple times a day and sometimes in the middle of the night.  Her [facility] primary care physician has seen her recently and has been treating her with for depression and generalized anxiety disorder for a long period of time.    The patient has no documented known coronary artery disease.  She admits she has had an EKG in the past but has not had one for many years.  She denies any chest discomfort, heaviness tightness or pressure in the chest.  She does have shortness of breath on exertion when she walks fast when she is in a hurry or when she walks for very long distances.  She also has some feelings of dyspnea when she feels anxious or upset.  She has no orthopnea or PND and  has no lower extremity edema.  She has no palpitations, syncope or near syncope.  She states that her primary care physician has tried to have her get a stress test for many years but she has declined to do so.  She has never been told she has an abnormal EKG, that she has heart failure or coronary artery disease.    Her medical problems include hypertension, hypertriglyceridemia which is familial, generalized anxiety disorder and depression, stress incontinence with urinary tract infections.  Family who are present have concerns about her following directions post discharge and appropriate work-up.  In dealing with the chronic stressors in her life related to her 's situation.  Allergies:     No Known Allergies    Past Medical History:     Past Medical History:   Diagnosis Date    Anxiety     CAD (coronary artery disease)     CKD (chronic kidney disease) stage 3, GFR 30-59 ml/min (CMS/[facility])     Depression     Hyperlipidemia, unspecified 09/23/2015    Dyslipidemia    Hypertension     Overweight 09/23/2015    Overweight and obesity    Personal history of other diseases of the circulatory system 09/23/2015    History of hypertension    Personal history of other diseases of the digestive system 09/23/2015    History of esophageal reflux    Personal history of other endocrine, nutritional and metabolic disease 12/15/2015    History of obesity    Stress incontinence (female) (male) 09/23/2015    Stress incontinence, female    Urinary tract infection        Past Surgical History:     Past Surgical History:   Procedure Laterality Date    CATARACT EXTRACTION      HYSTERECTOMY      KNEE SURGERY         Family History:     Family History   Problem Relation Name Age of Onset    Hypertension Father         Social History:     Social History     Socioeconomic History    Marital status:      Spouse name: None    Number of children: None    Years of education: None    Highest education level: None   Occupational  History    None   Tobacco Use    Smoking status: Never    Smokeless tobacco: Never   Substance and Sexual Activity    Alcohol use: Never    Drug use: Never    Sexual activity: None   Other Topics Concern    None   Social History Narrative    None     Social Determinants of Health     Financial Resource Strain: Not on file   Food Insecurity: Not on file   Transportation Needs: Not on file   Physical Activity: Not on file   Stress: Not on file   Social Connections: Not on file   Intimate Partner Violence: Not on file   Housing Stability: Not on file       Home Medications:     Prior to Admission medications    Medication Sig Start Date End Date Taking? Authorizing Provider   amLODIPine (Norvasc) 10 mg tablet Take 1 tablet (10 mg) by mouth once daily.    Historical Provider, MD   buPROPion XL (Wellbutrin XL) 300 mg 24 hr tablet Take 1 tablet (300 mg) by mouth once daily. Do not crush, chew, or split.    Historical Provider, MD   cranberry fruit (cranberry) 450 mg tablet Take 1 tablet by mouth once daily.    Historical Provider, MD   estradiol (Estrace) 0.01 % (0.1 mg/gram) vaginal cream Insert 2 g into the vagina 2 times a week.    Historical Provider, MD   metoprolol succinate XL (Toprol-XL) 25 mg 24 hr tablet Take 1 tablet (25 mg) by mouth 2 times a day. Do not crush or chew.    Historical Provider, MD   omega 3-dha-epa-fish oil (Fish OiL) 1,000 mg (120 mg-180 mg) capsule Take 1 capsule (1,000 mg) by mouth once daily.    Historical Provider, MD   rosuvastatin (Crestor) 10 mg tablet Take 1 tablet (10 mg) by mouth once daily.    Historical Provider, MD   venlafaxine XR (Effexor-XR) 37.5 mg 24 hr capsule Take 1 capsule (37.5 mg) by mouth once daily. Do not crush or chew.    Historical Provider, MD       Current Medications:     Current Outpatient Medications   Medication Instructions    amLODIPine (NORVASC) 10 mg, oral, Daily    buPROPion XL (WELLBUTRIN XL) 300 mg, oral, Daily, Do not crush, chew, or split.    cranberry  fruit (cranberry) 450 mg tablet 1 tablet, oral, Daily    estradiol (ESTRACE) 2 g, vaginal, 2 times weekly    metoprolol succinate XL (TOPROL-XL) 25 mg, oral, 2 times daily, Do not crush or chew.    omega 3-dha-epa-fish oil (Fish OiL) 1,000 mg (120 mg-180 mg) capsule 1 capsule, oral, Daily    rosuvastatin (CRESTOR) 10 mg, oral, Daily    venlafaxine XR (EFFEXOR-XR) 37.5 mg, oral, Daily, Do not crush or chew.        IV Medications:          Review of Systems:      Review of Systems   Constitutional:  Positive for fatigue. Negative for activity change, chills, fever and unexpected weight change.   HENT:  Positive for congestion and postnasal drip.    Eyes: Negative.    Respiratory:  Positive for shortness of breath. Negative for cough, choking, chest tightness, wheezing and stridor.    Cardiovascular:  Negative for chest pain, palpitations and leg swelling.   Gastrointestinal:  Positive for constipation. Negative for anal bleeding and blood in stool.   Genitourinary:  Positive for dysuria, frequency and urgency. Negative for vaginal discharge.   Musculoskeletal:  Positive for arthralgias.   Skin: Negative.    Allergic/Immunologic: Negative.    Neurological:  Negative for dizziness and headaches.   Hematological:  Negative for adenopathy. Bruises/bleeds easily.   Psychiatric/Behavioral:  Positive for dysphoric mood and sleep disturbance. The patient is nervous/anxious.    All other systems reviewed and are negative.      Vital Signs:     Vitals:    10/19/23 2300 10/20/23 0530 10/20/23 0859 10/20/23 1650   BP: 166/79 140/86 137/87 (!) 170/96   BP Location:  Right arm Right arm Left arm   Patient Position:  Lying Sitting Sitting   Pulse: 92 76 104 99   Resp: 16 16 18 18   Temp:  36.5 °C (97.7 °F)  37.1 °C (98.8 °F)   TempSrc:  Temporal  Temporal   SpO2: 97% 95% 96% 98%   Weight:       Height:         No intake or output data in the 24 hours ending 10/20/23 1920  Vitals:    10/19/23 1810   Weight: 74.8 kg (165 lb)  "      Physical Examination:     GENERAL APPEARANCE: Well developed, well nourished, in no acute distress.  HEENT: No gross abnormalities of conjunctiva, teeth, gums, oral mucosa  NECK: Reduced range of motion, no JVD, no bruit. Thyroid not palpable. Carotid upstrokes normal.  CHEST: Symmetric and non-tender.  LUNGS: Clear to auscultation bilaterally; normal respiratory effort.  HEART: PMI is nondisplaced.  There is a regular rhythm with a normal S1 and S2 there is no appreciable S3.  There is a soft systolic ejection murmur heard along the upper sternal border no diastolic murmur.  No carotid or abdominal bruits.  ABDOMEN: Soft, nontender, no palpable hepatosplenomegaly, no mases, no bruits. Abdominal aorta not noted to be enlarged.  MUSCULOSKELETAL: The patient has mild sarcopenia and osteoarthritic changes.  Gait was not visualized.    EXTREMITIES: Warm with good color, no clubbing or cyanois. There is no edema noted.  PERIPHERAL VASCULAR: Pulses present and equally palpable; 1+ pedal pulses. No femoral bruits.  NEURO/PSHCY: Alert and oriented x3; patient with pressured speech, rapid change in topics.  No focal motor deficits were seen, intact light touch  INTEGUMENT: Skin warm and dry, without gross excoriationis or lesions.  LYMPH: No significant palpable lymphadenopathy      Lab:     CBC:   Lab Results   Component Value Date    WBC 8.2 10/20/2023    RBC 5.05 10/20/2023    HGB 14.0 10/20/2023    HCT 43.1 10/20/2023     10/20/2023        CMP:    Lab Results   Component Value Date     10/20/2023    K 3.9 10/20/2023     10/20/2023    CO2 26 10/20/2023    BUN 24 (H) 10/20/2023    CREATININE 1.07 (H) 10/20/2023    GLUCOSE 109 (H) 10/20/2023    CALCIUM 9.8 10/20/2023       Magnesium:    Lab Results   Component Value Date    MG 2.49 (H) 10/20/2023       Lipid Profile:    No results found for: \"CHLPL\", \"TRIG\", \"HDL\", \"LDLCALC\", \"LDLDIRECT\"    TSH:    Lab Results   Component Value Date    TSH 3.68 " "10/19/2023       BNP:   No results found for: \"BNP\"     PT/INR:    No results found for: \"PROTIME\", \"INR\"    HgBA1c:    No results found for: \"HGBA1C\"    BMP:  Lab Results   Component Value Date     10/20/2023     10/19/2023    K 3.9 10/20/2023    K 3.6 10/19/2023     10/20/2023     10/19/2023    CO2 26 10/20/2023    CO2 25 10/19/2023    BUN 24 (H) 10/20/2023    BUN 17 10/19/2023    CREATININE 1.07 (H) 10/20/2023    CREATININE 1.03 10/19/2023       CBC:  Lab Results   Component Value Date    WBC 8.2 10/20/2023    WBC 7.2 10/19/2023    RBC 5.05 10/20/2023    RBC 5.22 (H) 10/19/2023    HGB 14.0 10/20/2023    HGB 14.5 10/19/2023    HCT 43.1 10/20/2023    HCT 44.2 10/19/2023    MCV 85 10/20/2023    MCV 85 10/19/2023    MCH 27.7 10/20/2023    MCH 27.8 10/19/2023    MCHC 32.5 10/20/2023    MCHC 32.8 10/19/2023    RDW 12.3 10/20/2023    RDW 12.0 10/19/2023     10/20/2023     10/19/2023    MPV 9.6 10/20/2023    MPV 9.7 10/19/2023       Cardiac Enzymes:    Lab Results   Component Value Date    TROPHS 45 (H) 10/20/2023    TROPHS 55 (HH) 10/19/2023    TROPHS 33 (H) 10/19/2023       Hepatic Function Panel:    Lab Results   Component Value Date    ALKPHOS 81 10/19/2023    ALT 15 10/19/2023    AST 20 10/19/2023    PROT 7.0 10/19/2023    BILITOT 1.0 10/19/2023         Diagnostic Studies:     XR chest 1 view    Result Date: 10/19/2023  STUDY: Chest Radiograph;  10/19/2023 5:49 PM. INDICATION: Altered mental status. COMPARISON: None Available. ACCESSION NUMBER(S): [id number] ORDERING CLINICIAN: MAX BEDOYA TECHNIQUE:  Frontal chest was obtained at 18:30 hours. FINDINGS: Both lungs are clear. The heart and mediastinum are of normal size and contour.  No pleural effusion.  No pneumothorax. No acute bony abnormality identified.    No findings of an acute cardiopulmonary process. Signed by Reymundo Fierro MD    CT head wo IV contrast    Result Date: 10/19/2023  Interpreted By:  Abigail, " Janna, STUDY: CT HEAD WO IV CONTRAST;  10/19/2023 6:33 pm   INDICATION: AMS, word salad.   COMPARISON: None.   ACCESSION NUMBER(S): [id number]   ORDERING CLINICIAN: MAX BEDOYA   TECHNIQUE: Axial noncontrast CT images of the head.   FINDINGS: BRAIN PARENCHYMA: Gray-white matter interfaces are preserved. No mass effect or midline shift. Mild nonspecific white matter changes and parenchymal volume loss.   HEMORRHAGE: No acute intracranial hemorrhage. VENTRICLES and EXTRA-AXIAL SPACES: The ventricles, sulci and basal cisterns enlarged, concordant with parenchymal volume loss. EXTRACRANIAL SOFT TISSUES: Within normal limits. PARANASAL SINUSES/MASTOIDS: The visualized paranasal sinuses and mastoid air cells are aerated. CALVARIUM: No depressed skull fracture. No destructive osseous lesion.   OTHER FINDINGS: None.       No acute intracranial hemorrhage or mass effect.   Mild nonspecific white matter changes and parenchymal volume loss.     MACRO: None.   Signed by: Janna Hayes 10/19/2023 7:13 PM Dictation workstation:   [id number]    EKG x2 showed sinus rhythm with premature atrial beats, incomplete right bundle branch block, left anterior fascicular block, nonspecific ST and T abnormality.  There is no comparison EKG on the [facility] chart for comparison.    Radiology:     XR chest 1 view   Final Result   No findings of an acute cardiopulmonary process.   Signed by Reymundo Fierro MD      CT head wo IV contrast   Final Result   No acute intracranial hemorrhage or mass effect.        Mild nonspecific white matter changes and parenchymal volume loss.             MACRO:   None.        Signed by: Janna Hayes 10/19/2023 7:13 PM   Dictation workstation:   [id number]          Assessment:     Problem List Items Addressed This Visit       * (Principal) Altered mental status, unspecified altered mental status type - Primary       Patient Active Problem List   Diagnosis    Altered mental status, unspecified  altered mental status type    Abnormal EKG    Elevated troponin    Shortness of breath on exertion       Plan:   1.  Abnormal mental status.  Patient is back to her baseline status at this time according to the patient's son who is at the bedside.  While the urinalysis is abnormal there is nothing to suggest a urinary tract infection, with negative nitrates and leuk esterase.  The patient does have a history of urinary tract infections.  We will defer further work-up to the primary care physician.  2.  Abnormal EKG.  While the patient's EKG is abnormal we have no idea what her baseline is.  She has no signs or symptoms of angina, heart failure, or arrhythmia.  3.  Mildly elevated troponins.  Patient has very mild troponin elevation.  Unclear whether this is in part due to the hypertension she was experiencing in the emergency room or some other stressor.  I have requested an echocardiogram to look at patient's structural function of the heart and to exclude any significant wall motion abnormalities.  If her LV function is normal I think it is reasonable if she ambulates without chest pain for her to be discharged and follow-up with an outpatient stress test.  4.  Hypertension.  Patient's blood pressures were markedly elevated in the emergency room.  She has not received any of her blood pressure medications would get those started while here so she can be brought back under control.  5.  Shortness of breath on exertion.  Her symptoms of shortness of breath are not suggestive of heart failure at this time.  The echocardiogram will address if she has any systolic or diastolic dysfunction.  6.  Generalized anxiety.  Wondering how much that plays into the problem with #1 above.  She is under the care of her regular physician will defer to the primary service at this time whether there is any adjustments that would be appropriate while during this hospitalization.    We will make her final recommendations after review of  the echocardiogram tomorrow.  Recommendations were reviewed with the patient and her son at the bedside.  She receives all of her care within the [facility] system and would like to follow-up with a [facility] cardiologist.      Thank you for the opportunity to participate in the care of your patient.  Please do not hesitate to call if you have any questions.    Electronically signed by Jordana Pérez MD, on 10/20/2023 at 7:20 PM

## 2023-10-20 NOTE — CONSULTS
"History Of Present Illness  Charisse Acharya is a 78 y.o. female presenting with AMS.    The patient was brought to the ER due to AMS.  The patient states that she has vague recollection of the events but is unable to provide much detail.  Per chart review, she apparently was behaving erractically to the leasing office at her apartment building.  This is what prompted them to call the ambulance.  In the ER, she was showing a flight of ideas.  She was fixated on certain topics like who she is dating.  When asked about this, she said, \"I think I may have said this.\"  She does tell me that she lives alone.  She is a retired nurse.  The patient denies any headaches, double vision, loss of peripheral vision, slurred speech, difficulty getting the words out, facial droop, facial numbness, focal weakness, focal numbness, loss of coordination, or loss of balance.    She does admit to a history of depression.  She is on Effexor.  She states that she used to see a psychiatrist but now gets her Effexor from her PCP.    Past Medical History  Past Medical History:   Diagnosis Date    Hyperlipidemia, unspecified 09/23/2015    Dyslipidemia    Overweight 09/23/2015    Overweight and obesity    Personal history of other diseases of the circulatory system 09/23/2015    History of hypertension    Personal history of other diseases of the digestive system 09/23/2015    History of esophageal reflux    Personal history of other endocrine, nutritional and metabolic disease 12/15/2015    History of obesity    Stress incontinence (female) (male) 09/23/2015    Stress incontinence, female     Surgical History  History reviewed. No pertinent surgical history.  Social History  Social History     Tobacco Use    Smoking status: Never    Smokeless tobacco: Never     Allergies  Patient has no known allergies.  (Not in a hospital admission)      Review of Systems  Neurological Exam  Physical Exam  Last Recorded Vitals  Blood pressure 137/87, pulse " "104, temperature 36.5 °C (97.7 °F), temperature source Temporal, resp. rate 18, height 1.651 m (5' 5\"), weight 74.8 kg (165 lb), SpO2 96 %.    Gen: NAD  Neuro:  --HIF: A&O X 3, repetition and naming intact, registration 3/3 objects, recall 1/3 objects able to spell 'world' forward and backward  --CN:  PERRLA, EOMI, VFF, no visible facial asymmetry, facial sensation intact, no tongue or palatal deviation, SCM intact  --Motor: Moves all 4 extremities equally; no focal deficits  --Sensory: Intact to light touch, intact to pinprick  --Reflex: 2+ symmetric, toes down  --Cerebellum: FTN and HTS intact  --Gait: Deferred     Relevant Results                    [place] Coma Scale  Best Eye Response: Spontaneous  Best Verbal Response: Confused  Best Motor Response: Follows commands  [place] Coma Scale Score: 14                 I have personally reviewed the following imaging results XR chest 1 view    Result Date: 10/19/2023  STUDY: Chest Radiograph;  10/19/2023 5:49 PM. INDICATION: Altered mental status. COMPARISON: None Available. ACCESSION NUMBER(S): [id number] ORDERING CLINICIAN: MAX BEDOYA TECHNIQUE:  Frontal chest was obtained at 18:30 hours. FINDINGS: Both lungs are clear. The heart and mediastinum are of normal size and contour.  No pleural effusion.  No pneumothorax. No acute bony abnormality identified.    No findings of an acute cardiopulmonary process. Signed by Reymundo Fierro MD    CT head wo IV contrast    Result Date: 10/19/2023  Interpreted By:  Janna Hayes, STUDY: CT HEAD WO IV CONTRAST;  10/19/2023 6:33 pm   INDICATION: AMS, word salad.   COMPARISON: None.   ACCESSION NUMBER(S): [id number]   ORDERING CLINICIAN: MAX BEDOYA   TECHNIQUE: Axial noncontrast CT images of the head.   FINDINGS: BRAIN PARENCHYMA: Gray-white matter interfaces are preserved. No mass effect or midline shift. Mild nonspecific white matter changes and parenchymal volume loss.   HEMORRHAGE: No acute intracranial " hemorrhage. VENTRICLES and EXTRA-AXIAL SPACES: The ventricles, sulci and basal cisterns enlarged, concordant with parenchymal volume loss. EXTRACRANIAL SOFT TISSUES: Within normal limits. PARANASAL SINUSES/MASTOIDS: The visualized paranasal sinuses and mastoid air cells are aerated. CALVARIUM: No depressed skull fracture. No destructive osseous lesion.   OTHER FINDINGS: None.       No acute intracranial hemorrhage or mass effect.   Mild nonspecific white matter changes and parenchymal volume loss.     MACRO: None.   Signed by: Janna Hayes 10/19/2023 7:13 PM Dictation workstation:   [id number]    XR knee    Result Date: 9/29/2023  Interpreted By:  KIRAN FORD MD MRN: [id number] Patient Name: ALEXANDRE SANTIAGO  STUDY: Left knee dated  9/29/2023.  INDICATION: s/p TKR  Z96.652: Status post total left knee replacement  COMPARISON: Radiographs dated 04/01/2022.  ACCESSION NUMBER(S): [id number]  ORDERING CLINICIAN: SIMON MONTANA  TECHNIQUE: Three views of the Left knee.  FINDINGS: No fracture or dislocation is evident.  No knee effusion is evident. There is a total knee arthroplasty. Hardware is intact as visualized. No soft tissue gas or radiopaque foreign body is evident.      No osseous injury or hardware complication is evident.  MACRO: None      CT Brain (I personally reviewed the images/tracings with the following interpretation)  No acute changes     Assessment/Plan   Principal Problem:    Altered mental status, unspecified altered mental status type    This is a 78 year old female presenting for evaluation of altered mental status.  She presented to the ER with flight of ideas, delusions, and fixation on certain things.  On my exam, she is A&O X 3.  She was able to spell 'world' forward and backward.  She did have mild difficulty with recall.  She has no focal neurological deficits.    At this point, I would recommend psychiatry evaluation.  My level of suspicion for a primary neurological issue is low.  We can  consider an MRI Brain and EEG if psychiatry does not feel like this is related to an underlying psychiatric issue.      George Huddleston MD

## 2023-10-20 NOTE — H&P
History Of Present Illness  Charisse Acharya is a 78 y.o. female with PMHx of CKD3, CAD, HTN, HLD, IBS, anxiety and depression presenting with altered mental status. Pt states she is unsure what happened that brought her into hospital, states she is from home and had some recent medication changes with her wellbutrin and believes the confusion may be related to this. Pt currently alert and oriented to person/place/time/situation. Denies any CP, palpitations, shortness of breath, nausea or vomiting. States she has frequent UTIs but denies any current symptoms.    ED course:   EKG revealed sinus rhythm, with T wave inversion in leads v1, v2, v3, v4, v5. CT head negative for acute process. Pt received benadryl/haldol/ativan in ED. Labs significant for troponin 33/55/45. UA negative.      Past Medical History  Past Medical History:   Diagnosis Date    Anxiety     CAD (coronary artery disease)     CKD (chronic kidney disease) stage 3, GFR 30-59 ml/min (CMS/[facility])     Depression     Hyperlipidemia, unspecified 09/23/2015    Dyslipidemia    Hypertension     Overweight 09/23/2015    Overweight and obesity    Personal history of other diseases of the circulatory system 09/23/2015    History of hypertension    Personal history of other diseases of the digestive system 09/23/2015    History of esophageal reflux    Personal history of other endocrine, nutritional and metabolic disease 12/15/2015    History of obesity    Stress incontinence (female) (male) 09/23/2015    Stress incontinence, female    Urinary tract infection        Surgical History  Past Surgical History:   Procedure Laterality Date    CATARACT EXTRACTION      HYSTERECTOMY      KNEE SURGERY          Social History  She reports that she has never smoked. She has never used smokeless tobacco. She reports that she does not drink alcohol and does not use drugs.    Family History  Family History   Problem Relation Name Age of Onset    Hypertension Father         "  Allergies  Patient has no known allergies.    Review of Systems   Constitutional:  Negative for chills and diaphoresis.   HENT:  Negative for congestion and sore throat.    Eyes:  Negative for redness and visual disturbance.   Respiratory:  Negative for chest tightness and shortness of breath.    Cardiovascular:  Negative for chest pain and palpitations.   Gastrointestinal:  Negative for abdominal distention, abdominal pain, constipation and nausea.   Genitourinary:  Negative for frequency and urgency.   Skin:  Negative for color change and rash.   Neurological:  Negative for dizziness, speech difficulty, numbness and headaches.   Psychiatric/Behavioral:  Negative for agitation and hallucinations. The patient is nervous/anxious.         Physical Exam  Constitutional:       Appearance: Normal appearance.   HENT:      Head: Normocephalic.      Mouth/Throat:      Mouth: Mucous membranes are moist.   Eyes:      Extraocular Movements: Extraocular movements intact.      Pupils: Pupils are equal, round, and reactive to light.   Cardiovascular:      Rate and Rhythm: Normal rate and regular rhythm.      Pulses: Normal pulses.      Heart sounds: Normal heart sounds.   Pulmonary:      Effort: Pulmonary effort is normal.      Breath sounds: Normal breath sounds.   Abdominal:      General: Bowel sounds are normal.      Palpations: Abdomen is soft.   Musculoskeletal:         General: No swelling or deformity.   Skin:     General: Skin is warm and dry.      Capillary Refill: Capillary refill takes less than 2 seconds.   Neurological:      General: No focal deficit present.      Mental Status: She is alert and oriented to person, place, and time.   Psychiatric:         Mood and Affect: Mood normal.         Behavior: Behavior normal.          Last Recorded Vitals  Blood pressure 137/87, pulse 104, temperature 36.5 °C (97.7 °F), temperature source Temporal, resp. rate 18, height 1.651 m (5' 5\"), weight 74.8 kg (165 lb), SpO2 96 " %.    Relevant Results      Current Facility-Administered Medications:     acetaminophen (Tylenol) tablet 650 mg, 650 mg, oral, q4h PRN **OR** acetaminophen (Tylenol) oral liquid 650 mg, 650 mg, oral, q4h PRN **OR** acetaminophen (Tylenol) suppository 650 mg, 650 mg, rectal, q4h PRN, Carol Mayo PA-C    melatonin tablet 3 mg, 3 mg, oral, Daily, Carol Mayo PA-C    polyethylene glycol (Glycolax, Miralax) packet 17 g, 17 g, oral, Daily, Carol Mayo PA-C    Current Outpatient Medications:     amLODIPine (Norvasc) 10 mg tablet, Take 1 tablet (10 mg) by mouth once daily., Disp: , Rfl:     buPROPion XL (Wellbutrin XL) 300 mg 24 hr tablet, Take 1 tablet (300 mg) by mouth once daily. Do not crush, chew, or split., Disp: , Rfl:     cranberry fruit (cranberry) 450 mg tablet, Take 1 tablet by mouth once daily., Disp: , Rfl:     estradiol (Estrace) 0.01 % (0.1 mg/gram) vaginal cream, Insert 2 g into the vagina 2 times a week., Disp: , Rfl:     metoprolol succinate XL (Toprol-XL) 25 mg 24 hr tablet, Take 1 tablet (25 mg) by mouth 2 times a day. Do not crush or chew., Disp: , Rfl:     omega 3-dha-epa-fish oil (Fish OiL) 1,000 mg (120 mg-180 mg) capsule, Take 1 capsule (1,000 mg) by mouth once daily., Disp: , Rfl:     rosuvastatin (Crestor) 10 mg tablet, Take 1 tablet (10 mg) by mouth once daily., Disp: , Rfl:     venlafaxine XR (Effexor-XR) 37.5 mg 24 hr capsule, Take 1 capsule (37.5 mg) by mouth once daily. Do not crush or chew., Disp: , Rfl:    Results for orders placed or performed during the hospital encounter of 10/19/23 (from the past 24 hour(s))   CBC with Differential   Result Value Ref Range    WBC 7.2 4.4 - 11.3 x10*3/uL    nRBC 0.0 0.0 - 0.0 /100 WBCs    RBC 5.22 (H) 4.00 - 5.20 x10*6/uL    Hemoglobin 14.5 12.0 - 16.0 g/dL    Hematocrit 44.2 36.0 - 46.0 %    MCV 85 80 - 100 fL    MCH 27.8 26.0 - 34.0 pg    MCHC 32.8 32.0 - 36.0 g/dL    RDW 12.0 11.5 - 14.5 %    Platelets 198 150 - 450  x10*3/uL    MPV 9.7 7.5 - 11.5 fL    Neutrophils % 75.7 40.0 - 80.0 %    Immature Granulocytes %, Automated 0.1 0.0 - 0.9 %    Lymphocytes % 16.3 13.0 - 44.0 %    Monocytes % 7.3 2.0 - 10.0 %    Eosinophils % 0.3 0.0 - 6.0 %    Basophils % 0.3 0.0 - 2.0 %    Neutrophils Absolute 5.42 1.60 - 5.50 x10*3/uL    Immature Granulocytes Absolute, Automated 0.01 0.00 - 0.50 x10*3/uL    Lymphocytes Absolute 1.17 0.80 - 3.00 x10*3/uL    Monocytes Absolute 0.52 0.05 - 0.80 x10*3/uL    Eosinophils Absolute 0.02 0.00 - 0.40 x10*3/uL    Basophils Absolute 0.02 0.00 - 0.10 x10*3/uL   Comprehensive Metabolic Panel   Result Value Ref Range    Glucose 122 (H) 74 - 99 mg/dL    Sodium 139 136 - 145 mmol/L    Potassium 3.6 3.5 - 5.3 mmol/L    Chloride 100 98 - 107 mmol/L    Bicarbonate 25 21 - 32 mmol/L    Anion Gap 18 10 - 20 mmol/L    Urea Nitrogen 17 6 - 23 mg/dL    Creatinine 1.03 0.50 - 1.05 mg/dL    eGFR 55 (L) >60 mL/min/1.73m*2    Calcium 9.8 8.6 - 10.3 mg/dL    Albumin 5.0 3.4 - 5.0 g/dL    Alkaline Phosphatase 81 33 - 136 U/L    Total Protein 7.0 6.4 - 8.2 g/dL    AST 20 9 - 39 U/L    Bilirubin, Total 1.0 0.0 - 1.2 mg/dL    ALT 15 7 - 45 U/L   TSH with reflex to Free T4 if abnormal   Result Value Ref Range    Thyroid Stimulating Hormone 3.68 0.44 - 3.98 mIU/L   Acute Toxicology Panel, Blood   Result Value Ref Range    Acetaminophen <10.0 10.0 - 30.0 ug/mL    Salicylate  <3 4 - 20 mg/dL    Alcohol <10 <=10 mg/dL   Magnesium   Result Value Ref Range    Magnesium 2.39 1.60 - 2.40 mg/dL   Troponin I, High Sensitivity, Initial   Result Value Ref Range    Troponin I, High Sensitivity 33 (H) 0 - 13 ng/L   Drug Screen, Urine   Result Value Ref Range    Amphetamine Screen, Urine Presumptive Negative Presumptive Negative    Barbiturate Screen, Urine Presumptive Negative Presumptive Negative    Benzodiazepines Screen, Urine Presumptive Negative Presumptive Negative    Cannabinoid Screen, Urine Presumptive Negative Presumptive Negative     Cocaine Metabolite Screen, Urine Presumptive Negative Presumptive Negative    Fentanyl Screen, Urine Presumptive Negative Presumptive Negative    Opiate Screen, Urine Presumptive Negative Presumptive Negative    Oxycodone Screen, Urine Presumptive Negative Presumptive Negative    PCP Screen, Urine Presumptive Negative Presumptive Negative   Urinalysis with Reflex Microscopic and Culture   Result Value Ref Range    Color, Urine Yellow Straw, Yellow    Appearance, Urine Hazy (N) Clear    Specific Gravity, Urine 1.018 1.005 - 1.035    pH, Urine 6.0 5.0, 5.5, 6.0, 6.5, 7.0, 7.5, 8.0    Protein, Urine 100 (2+) (N) NEGATIVE mg/dL    Glucose, Urine NEGATIVE NEGATIVE mg/dL    Blood, Urine SMALL (1+) (A) NEGATIVE    Ketones, Urine 20 (1+) (A) NEGATIVE mg/dL    Bilirubin, Urine NEGATIVE NEGATIVE    Urobilinogen, Urine <2.0 <2.0 mg/dL    Nitrite, Urine NEGATIVE NEGATIVE    Leukocyte Esterase, Urine NEGATIVE NEGATIVE   Extra Urine Gray Tube   Result Value Ref Range    Extra Tube Hold for add-ons.    Urinalysis Microscopic   Result Value Ref Range    WBC, Urine 1-5 1-5, NONE /HPF    RBC, Urine >20 (A) NONE, 1-2, 3-5 /HPF    Hyaline Casts, Urine 2+ (A) NONE /LPF   Troponin I, High Sensitivity   Result Value Ref Range    Troponin I, High Sensitivity 55 (HH) 0 - 13 ng/L   Troponin I, High Sensitivity   Result Value Ref Range    Troponin I, High Sensitivity 45 (H) 0 - 13 ng/L   Ammonia   Result Value Ref Range    Ammonia 32 16 - 53 umol/L    XR chest 1 view    Result Date: 10/19/2023  STUDY: Chest Radiograph;  10/19/2023 5:49 PM. INDICATION: Altered mental status. COMPARISON: None Available. ACCESSION NUMBER(S): [id number] ORDERING CLINICIAN: MAX BEDOYA TECHNIQUE:  Frontal chest was obtained at 18:30 hours. FINDINGS: Both lungs are clear. The heart and mediastinum are of normal size and contour.  No pleural effusion.  No pneumothorax. No acute bony abnormality identified.    No findings of an acute cardiopulmonary process.  Signed by Reymundo Fierro MD    CT head wo IV contrast    Result Date: 10/19/2023  Interpreted By:  Janna Hayes, STUDY: CT HEAD WO IV CONTRAST;  10/19/2023 6:33 pm   INDICATION: AMS, word salad.   COMPARISON: None.   ACCESSION NUMBER(S): [id number]   ORDERING CLINICIAN: MAX BEDOYA   TECHNIQUE: Axial noncontrast CT images of the head.   FINDINGS: BRAIN PARENCHYMA: Gray-white matter interfaces are preserved. No mass effect or midline shift. Mild nonspecific white matter changes and parenchymal volume loss.   HEMORRHAGE: No acute intracranial hemorrhage. VENTRICLES and EXTRA-AXIAL SPACES: The ventricles, sulci and basal cisterns enlarged, concordant with parenchymal volume loss. EXTRACRANIAL SOFT TISSUES: Within normal limits. PARANASAL SINUSES/MASTOIDS: The visualized paranasal sinuses and mastoid air cells are aerated. CALVARIUM: No depressed skull fracture. No destructive osseous lesion.   OTHER FINDINGS: None.       No acute intracranial hemorrhage or mass effect.   Mild nonspecific white matter changes and parenchymal volume loss.     MACRO: None.   Signed by: Janna Hayes 10/19/2023 7:13 PM Dictation workstation:   [id number]         Assessment/Plan   Principal Problem:    Altered mental status, unspecified altered mental status type  CKD3  CAD  HTN  HLD  Anxiety and Depression     Neurology consulted  Psychiatry consulted  CT head negative for acute process  EKG w/T wave inversion in V1-V5, troponin 33/55/45; Cardiology consulted  NPO until pt passes bedside swallow then can advance to regular diet    DVT prophylaxis     Defer to Attending for continued tx plan and orders       I spent 40 minutes in the professional and overall care of this patient.      Deepa Mcdonald, ANIA-CNP

## 2023-10-20 NOTE — H&P
History Of Present Illness  Charisse Acharya is a 78 y.o. female presenting with  PMH of CKD III,  diverticulosis, JONA, HTN, lichen sclerosis, major depressive disorder, recurrent UTIs, depression who is on Effexor and bupropion, presented to ER with AMS and acute psychosis.  Apparently her leasing office at her apartment called the squad after patient called their office and was behaving erratically, they are familiar with this patient and she does not normally act like this.  Patient is a poor historian. Pt is  very hyper fixated on somebody named Kar who she claims to be 78 y/o and is once she claimed him to be her son and later on mentioned him as her .  She is very hyper fixated on whom he is dating and having sexual relations.     She denies any c/p SOB, abdominal pain, burning frequency or urgency with urination, denies headache or head trauma. She denies ingesting too much of her medications or any drugs or alcohol. Pt was admitted on tele floor for further care.                   Past Medical History  She has a past medical history of Anxiety, CAD (coronary artery disease), CKD (chronic kidney disease) stage 3, GFR 30-59 ml/min (CMS/[facility]), Depression, Hyperlipidemia, unspecified (09/23/2015), Hypertension, Overweight (09/23/2015), Personal history of other diseases of the circulatory system (09/23/2015), Personal history of other diseases of the digestive system (09/23/2015), Personal history of other endocrine, nutritional and metabolic disease (12/15/2015), Stress incontinence (female) (male) (09/23/2015), and Urinary tract infection.    Surgical History  She has a past surgical history that includes Hysterectomy; Cataract extraction; and Knee surgery.     Social History  She reports that she has never smoked. She has never used smokeless tobacco. She reports that she does not drink alcohol and does not use drugs.    Family History  Family History   Problem Relation Name Age of Onset     Hypertension Father          Allergies  Patient has no known allergies.    Current medications:      Current Facility-Administered Medications:     acetaminophen (Tylenol) tablet 650 mg, 650 mg, oral, q4h PRN **OR** acetaminophen (Tylenol) oral liquid 650 mg, 650 mg, oral, q4h PRN **OR** acetaminophen (Tylenol) suppository 650 mg, 650 mg, rectal, q4h PRN, Carol Mayo PA-C    amLODIPine (Norvasc) tablet 10 mg, 10 mg, oral, Daily, ANIA Jolly-CNP    buPROPion XL (Wellbutrin XL) 24 hr tablet 300 mg, 300 mg, oral, Daily, Deepa Mcdonald APRN-CNP    enoxaparin (Lovenox) syringe 40 mg, 40 mg, subcutaneous, Daily, ANIA Jolly-CNP    icosapent ethyL (Vascepa) capsule 1 g, 1 capsule, oral, Daily, ANIA Jolly-CNP    melatonin tablet 3 mg, 3 mg, oral, Daily, Carol Mayo PA-C    metoprolol succinate XL (Toprol-XL) 24 hr tablet 25 mg, 25 mg, oral, BID, ANIA Jolly-CNP    polyethylene glycol (Glycolax, Miralax) packet 17 g, 17 g, oral, Daily, Carol Mayo PA-C    rosuvastatin (Crestor) tablet 10 mg, 10 mg, oral, Nightly, ANIA Jolly-CNP    venlafaxine XR (Effexor-XR) 24 hr capsule 37.5 mg, 37.5 mg, oral, Daily, ANIA Jolly-CNP     ROS: Unable to obtain due to underlying condition       Physical Exam  HENT:      Head: Normocephalic.   Eyes:      Conjunctiva/sclera: Conjunctivae normal.   Cardiovascular:      Rate and Rhythm: Regular rhythm.   Pulmonary:      Breath sounds: Normal breath sounds.   Abdominal:      General: Bowel sounds are normal.      Palpations: Abdomen is soft.   Musculoskeletal:         General: Normal range of motion.   Skin:     General: Skin is warm and dry.   Neurological:      General: No focal deficit present.      Mental Status: She is alert.   Psychiatric:         Behavior: Behavior normal.          Last Recorded Vitals      Blood pressure (!) 170/96, pulse 99, temperature 37.1 °C (98.8 °F),  "temperature source Temporal, resp. rate 18, height 1.651 m (5' 5\"), weight 74.8 kg (165 lb), SpO2 98 %.    Relevant Results     Results for orders placed or performed during the hospital encounter of 10/19/23 (from the past 24 hour(s))   Drug Screen, Urine   Result Value Ref Range    Amphetamine Screen, Urine Presumptive Negative Presumptive Negative    Barbiturate Screen, Urine Presumptive Negative Presumptive Negative    Benzodiazepines Screen, Urine Presumptive Negative Presumptive Negative    Cannabinoid Screen, Urine Presumptive Negative Presumptive Negative    Cocaine Metabolite Screen, Urine Presumptive Negative Presumptive Negative    Fentanyl Screen, Urine Presumptive Negative Presumptive Negative    Opiate Screen, Urine Presumptive Negative Presumptive Negative    Oxycodone Screen, Urine Presumptive Negative Presumptive Negative    PCP Screen, Urine Presumptive Negative Presumptive Negative   Urinalysis with Reflex Microscopic and Culture   Result Value Ref Range    Color, Urine Yellow Straw, Yellow    Appearance, Urine Hazy (N) Clear    Specific Gravity, Urine 1.018 1.005 - 1.035    pH, Urine 6.0 5.0, 5.5, 6.0, 6.5, 7.0, 7.5, 8.0    Protein, Urine 100 (2+) (N) NEGATIVE mg/dL    Glucose, Urine NEGATIVE NEGATIVE mg/dL    Blood, Urine SMALL (1+) (A) NEGATIVE    Ketones, Urine 20 (1+) (A) NEGATIVE mg/dL    Bilirubin, Urine NEGATIVE NEGATIVE    Urobilinogen, Urine <2.0 <2.0 mg/dL    Nitrite, Urine NEGATIVE NEGATIVE    Leukocyte Esterase, Urine NEGATIVE NEGATIVE   Extra Urine Gray Tube   Result Value Ref Range    Extra Tube Hold for add-ons.    Urinalysis Microscopic   Result Value Ref Range    WBC, Urine 1-5 1-5, NONE /HPF    RBC, Urine >20 (A) NONE, 1-2, 3-5 /HPF    Hyaline Casts, Urine 2+ (A) NONE /LPF   Troponin I, High Sensitivity   Result Value Ref Range    Troponin I, High Sensitivity 55 (HH) 0 - 13 ng/L   Troponin I, High Sensitivity   Result Value Ref Range    Troponin I, High Sensitivity 45 (H) 0 - 13 " ng/L   Ammonia   Result Value Ref Range    Ammonia 32 16 - 53 umol/L   CBC   Result Value Ref Range    WBC 8.2 4.4 - 11.3 x10*3/uL    nRBC 0.0 0.0 - 0.0 /100 WBCs    RBC 5.05 4.00 - 5.20 x10*6/uL    Hemoglobin 14.0 12.0 - 16.0 g/dL    Hematocrit 43.1 36.0 - 46.0 %    MCV 85 80 - 100 fL    MCH 27.7 26.0 - 34.0 pg    MCHC 32.5 32.0 - 36.0 g/dL    RDW 12.3 11.5 - 14.5 %    Platelets 229 150 - 450 x10*3/uL    MPV 9.6 7.5 - 11.5 fL   Basic Metabolic Panel   Result Value Ref Range    Glucose 109 (H) 74 - 99 mg/dL    Sodium 139 136 - 145 mmol/L    Potassium 3.9 3.5 - 5.3 mmol/L    Chloride 103 98 - 107 mmol/L    Bicarbonate 26 21 - 32 mmol/L    Anion Gap 14 10 - 20 mmol/L    Urea Nitrogen 24 (H) 6 - 23 mg/dL    Creatinine 1.07 (H) 0.50 - 1.05 mg/dL    eGFR 53 (L) >60 mL/min/1.73m*2    Calcium 9.8 8.6 - 10.3 mg/dL   Magnesium   Result Value Ref Range    Magnesium 2.49 (H) 1.60 - 2.40 mg/dL          Radiology    CT HEAD WO IV CONTRAST;  10/19/2023 6:33 pm      INDICATION:  AMS, word salad.      COMPARISON:  None.      ACCESSION NUMBER(S):  [id number]      ORDERING CLINICIAN:  MAX BEDOYA      TECHNIQUE:  Axial noncontrast CT images of the head.      FINDINGS:  BRAIN PARENCHYMA: Gray-white matter interfaces are preserved. No mass  effect or midline shift. Mild nonspecific white matter changes and  parenchymal volume loss.      HEMORRHAGE: No acute intracranial hemorrhage.  VENTRICLES and EXTRA-AXIAL SPACES: The ventricles, sulci and basal  cisterns enlarged, concordant with parenchymal volume loss.  EXTRACRANIAL SOFT TISSUES: Within normal limits. PARANASAL  SINUSES/MASTOIDS: The visualized paranasal sinuses and mastoid air  cells are aerated. CALVARIUM: No depressed skull fracture. No  destructive osseous lesion.      OTHER FINDINGS: None.      IMPRESSION:  No acute intracranial hemorrhage or mass effect.      Mild nonspecific white matter changes and parenchymal volume loss.         Assessment/Plan   Principal  Problem:    Altered mental status, unspecified altered mental status type  Active Problems:    Abnormal EKG    Elevated troponin    Shortness of breath on exertion  CKD III  Elevated troponin, Type II MI  Hx of anxiety/depression    PLAN: Pt was admitted on RMF, Neuro and psych consult, monitor troponin level, may need cardiology consult, monitor kidney function, DVT prophylaxis, d/w CNP, follow closely, I ve coordinated the care.        Devonte Lopez MD

## 2023-10-20 NOTE — ED PROVIDER NOTES
"Emergency Medicine Transition of Care Note.    I received Charisse Acharya in signout from Sylwia Eddy.  Please see the previous ED provider note for all HPI, PE and MDM up to the time of signout at 2100. This is in addition to the primary record.    In brief Charisse Acharya is an 78 y.o. female presenting for   Chief Complaint   Patient presents with    Altered Mental Status     Per EMS pt called the leasing office disoriented, normally alert and oriented. Pt states \"I know I'm not delusional I'm Kar's mother.\" Flight of ideas Unable to de-escalate pt, continuously shouting.      At the time of signout we were awaiting: urinalysis results and admission versus EPAT evaluation     ED Course as of 10/20/23 1546   u Oct 19, 2023   1814 Attempted to contact patient's  Joel for collateral information, left VM after no answer. [MK]   2129 Troponin I, High Sensitivity(!): 33 [CL]   Fri Oct 20, 2023   0204 Patient was discussed with Dr. Lopez who agrees to accept admission. [CL]      ED Course User Index  [CL] Anton Berger DO  [MK] Sylwia Eddy PA-C         Diagnoses as of 10/20/23 1546   Altered mental status, unspecified altered mental status type       Medical Decision Making  Patient signed to me pending troponin and ultimate disposition.  Troponin did return elevated and was concerning for potential ACS as a cause of her altered mental status.  Urinalysis results are also obtained and were overall unremarkable without any evidence of UTI.  At this time the decision was made to admit the patient to medicine for continued ongoing treatment.    Please see ED course for the rest of the MDM.    Anton Berger DO, PGY-2  Emergency Medicine    Plan of care discussed with the attending physician.        Final diagnoses:   [R41.82] Altered mental status, unspecified altered mental status type           Procedure  Procedures    DO Anton Moreira DO  Resident  10/20/23 1546    "

## 2023-10-21 ENCOUNTER — APPOINTMENT (OUTPATIENT)
Dept: CARDIOLOGY | Facility: HOSPITAL | Age: 78
DRG: 885 | End: 2023-10-21
Payer: MEDICARE

## 2023-10-21 LAB
25(OH)D3 SERPL-MCNC: 37 NG/ML (ref 30–100)
ANION GAP SERPL CALC-SCNC: 12 MMOL/L (ref 10–20)
BUN SERPL-MCNC: 21 MG/DL (ref 6–23)
CALCIUM SERPL-MCNC: 9 MG/DL (ref 8.6–10.3)
CHLORIDE SERPL-SCNC: 106 MMOL/L (ref 98–107)
CO2 SERPL-SCNC: 27 MMOL/L (ref 21–32)
CREAT SERPL-MCNC: 1.03 MG/DL (ref 0.5–1.05)
EJECTION FRACTION APICAL 4 CHAMBER: 55.9
ERYTHROCYTE [DISTWIDTH] IN BLOOD BY AUTOMATED COUNT: 12.4 % (ref 11.5–14.5)
FOLATE SERPL-MCNC: 12.3 NG/ML
GFR SERPL CREATININE-BSD FRML MDRD: 55 ML/MIN/1.73M*2
GLUCOSE SERPL-MCNC: 99 MG/DL (ref 74–99)
HCT VFR BLD AUTO: 40.2 % (ref 36–46)
HGB BLD-MCNC: 12.8 G/DL (ref 12–16)
MAGNESIUM SERPL-MCNC: 2.37 MG/DL (ref 1.6–2.4)
MCH RBC QN AUTO: 27.8 PG (ref 26–34)
MCHC RBC AUTO-ENTMCNC: 31.8 G/DL (ref 32–36)
MCV RBC AUTO: 87 FL (ref 80–100)
NRBC BLD-RTO: 0 /100 WBCS (ref 0–0)
PLATELET # BLD AUTO: 213 X10*3/UL (ref 150–450)
PMV BLD AUTO: 9.8 FL (ref 7.5–11.5)
POTASSIUM SERPL-SCNC: 3.5 MMOL/L (ref 3.5–5.3)
RBC # BLD AUTO: 4.6 X10*6/UL (ref 4–5.2)
SODIUM SERPL-SCNC: 141 MMOL/L (ref 136–145)
T4 FREE SERPL-MCNC: 1.22 NG/DL (ref 0.61–1.12)
TSH SERPL-ACNC: 4.19 MIU/L (ref 0.44–3.98)
VIT B12 SERPL-MCNC: 431 PG/ML (ref 211–911)
WBC # BLD AUTO: 6.4 X10*3/UL (ref 4.4–11.3)

## 2023-10-21 PROCEDURE — 97161 PT EVAL LOW COMPLEX 20 MIN: CPT | Mod: GP

## 2023-10-21 PROCEDURE — 2500000001 HC RX 250 WO HCPCS SELF ADMINISTERED DRUGS (ALT 637 FOR MEDICARE OP): Performed by: PHYSICIAN ASSISTANT

## 2023-10-21 PROCEDURE — 90792 PSYCH DIAG EVAL W/MED SRVCS: CPT | Performed by: PSYCHIATRY & NEUROLOGY

## 2023-10-21 PROCEDURE — 84443 ASSAY THYROID STIM HORMONE: CPT | Performed by: PHYSICIAN ASSISTANT

## 2023-10-21 PROCEDURE — 84425 ASSAY OF VITAMIN B-1: CPT | Performed by: PHYSICIAN ASSISTANT

## 2023-10-21 PROCEDURE — 1200000002 HC GENERAL ROOM WITH TELEMETRY DAILY

## 2023-10-21 PROCEDURE — 83735 ASSAY OF MAGNESIUM: CPT | Performed by: PHYSICIAN ASSISTANT

## 2023-10-21 PROCEDURE — 82607 VITAMIN B-12: CPT | Mod: CMCLAB | Performed by: PHYSICIAN ASSISTANT

## 2023-10-21 PROCEDURE — 96372 THER/PROPH/DIAG INJ SC/IM: CPT | Performed by: NURSE PRACTITIONER

## 2023-10-21 PROCEDURE — 2500000001 HC RX 250 WO HCPCS SELF ADMINISTERED DRUGS (ALT 637 FOR MEDICARE OP): Performed by: NURSE PRACTITIONER

## 2023-10-21 PROCEDURE — 36415 COLL VENOUS BLD VENIPUNCTURE: CPT | Mod: STJLAB | Performed by: PHYSICIAN ASSISTANT

## 2023-10-21 PROCEDURE — 85027 COMPLETE CBC AUTOMATED: CPT | Performed by: PHYSICIAN ASSISTANT

## 2023-10-21 PROCEDURE — 80048 BASIC METABOLIC PNL TOTAL CA: CPT | Performed by: PHYSICIAN ASSISTANT

## 2023-10-21 PROCEDURE — 82746 ASSAY OF FOLIC ACID SERUM: CPT | Mod: CMCLAB | Performed by: PHYSICIAN ASSISTANT

## 2023-10-21 PROCEDURE — 82306 VITAMIN D 25 HYDROXY: CPT | Mod: STJLAB | Performed by: PHYSICIAN ASSISTANT

## 2023-10-21 PROCEDURE — 2500000001 HC RX 250 WO HCPCS SELF ADMINISTERED DRUGS (ALT 637 FOR MEDICARE OP): Performed by: PSYCHIATRY & NEUROLOGY

## 2023-10-21 PROCEDURE — 93306 TTE W/DOPPLER COMPLETE: CPT | Performed by: INTERNAL MEDICINE

## 2023-10-21 PROCEDURE — 99232 SBSQ HOSP IP/OBS MODERATE 35: CPT | Performed by: PSYCHIATRY & NEUROLOGY

## 2023-10-21 PROCEDURE — 93306 TTE W/DOPPLER COMPLETE: CPT

## 2023-10-21 PROCEDURE — 2500000004 HC RX 250 GENERAL PHARMACY W/ HCPCS (ALT 636 FOR OP/ED): Performed by: NURSE PRACTITIONER

## 2023-10-21 PROCEDURE — 84439 ASSAY OF FREE THYROXINE: CPT | Performed by: PHYSICIAN ASSISTANT

## 2023-10-21 PROCEDURE — 82306 VITAMIN D 25 HYDROXY: CPT | Mod: CMCLAB | Performed by: PHYSICIAN ASSISTANT

## 2023-10-21 PROCEDURE — 99232 SBSQ HOSP IP/OBS MODERATE 35: CPT | Performed by: INTERNAL MEDICINE

## 2023-10-21 RX ORDER — BUPROPION HYDROCHLORIDE 150 MG/1
150 TABLET ORAL DAILY
Status: DISCONTINUED | OUTPATIENT
Start: 2023-10-22 | End: 2023-10-24 | Stop reason: HOSPADM

## 2023-10-21 RX ORDER — MIRTAZAPINE 15 MG/1
7.5 TABLET, FILM COATED ORAL NIGHTLY
Status: DISCONTINUED | OUTPATIENT
Start: 2023-10-21 | End: 2023-10-24 | Stop reason: HOSPADM

## 2023-10-21 RX ORDER — BUPROPION HYDROCHLORIDE 150 MG/1
150 TABLET ORAL ONCE
Status: COMPLETED | OUTPATIENT
Start: 2023-10-21 | End: 2023-10-21

## 2023-10-21 RX ADMIN — ENOXAPARIN SODIUM 40 MG: 40 INJECTION SUBCUTANEOUS at 09:23

## 2023-10-21 RX ADMIN — MIRTAZAPINE 7.5 MG: 15 TABLET, FILM COATED ORAL at 20:22

## 2023-10-21 RX ADMIN — AMLODIPINE BESYLATE 10 MG: 10 TABLET ORAL at 09:23

## 2023-10-21 RX ADMIN — Medication 3 MG: at 20:22

## 2023-10-21 RX ADMIN — BUPROPION HYDROCHLORIDE 150 MG: 150 TABLET, FILM COATED, EXTENDED RELEASE ORAL at 11:29

## 2023-10-21 RX ADMIN — ICOSAPENT ETHYL 1 G: 1 CAPSULE ORAL at 09:26

## 2023-10-21 RX ADMIN — METOPROLOL SUCCINATE 25 MG: 25 TABLET, EXTENDED RELEASE ORAL at 09:23

## 2023-10-21 RX ADMIN — ROSUVASTATIN CALCIUM 10 MG: 10 TABLET, FILM COATED ORAL at 20:22

## 2023-10-21 RX ADMIN — METOPROLOL SUCCINATE 25 MG: 25 TABLET, EXTENDED RELEASE ORAL at 20:22

## 2023-10-21 ASSESSMENT — COGNITIVE AND FUNCTIONAL STATUS - GENERAL
MOBILITY SCORE: 24
MOBILITY SCORE: 23
CLIMB 3 TO 5 STEPS WITH RAILING: A LITTLE
DAILY ACTIVITIY SCORE: 24

## 2023-10-21 ASSESSMENT — PAIN - FUNCTIONAL ASSESSMENT: PAIN_FUNCTIONAL_ASSESSMENT: 0-10

## 2023-10-21 ASSESSMENT — PAIN SCALES - GENERAL
PAINLEVEL_OUTOF10: 0 - NO PAIN
PAINLEVEL_OUTOF10: 0 - NO PAIN

## 2023-10-21 NOTE — PROGRESS NOTES
Physical Therapy    Physical Therapy Evaluation    Patient Name: Charisse Acharya  MRN: [id number]  Today's Date: 10/21/2023   Time Calculation  Start Time: 1102  Stop Time: 1114  Time Calculation (min): 12 min    Assessment/Plan   PT Assessment  PT Assessment Results: Decreased endurance (c/o occasional SOB when walking.)  Rehab Prognosis: Good  Evaluation/Treatment Tolerance: Patient tolerated treatment well  Medical Staff Made Aware: Yes  End of Session Communication: Bedside nurse  Assessment Comment: very talkative and wants to go home. (seems forgetful.)  End of Session Patient Position: Up in chair, Alarm on  IP OR SWING BED PT PLAN  Inpatient or Swing Bed: Inpatient  PT Plan  Treatment/Interventions: Gait training, Therapeutic exercise  PT Plan:  (functions safely for home.)  PT Frequency: 4 times per week  PT Discharge Recommendations: Low intensity level of continued care  PT Recommended Transfer Status: Assist x1    Subjective     Current Problem:  1. Altered mental status, unspecified altered mental status type        2. Shortness of breath on exertion  Transthoracic Echo (TTE) Complete    Transthoracic Echo (TTE) Complete      3. Elevated troponin  Transthoracic Echo (TTE) Complete    Transthoracic Echo (TTE) Complete      4. Abnormal EKG  Transthoracic Echo (TTE) Complete    Transthoracic Echo (TTE) Complete        Past Medical History:   Diagnosis Date    Anxiety     CAD (coronary artery disease)     CKD (chronic kidney disease) stage 3, GFR 30-59 ml/min (CMS/[facility])     Depression     Hyperlipidemia, unspecified 09/23/2015    Dyslipidemia    Hypertension     Overweight 09/23/2015    Overweight and obesity    Personal history of other diseases of the circulatory system 09/23/2015    History of hypertension    Personal history of other diseases of the digestive system 09/23/2015    History of esophageal reflux    Personal history of other endocrine, nutritional and metabolic disease 12/15/2015     History of obesity    Stress incontinence (female) (male) 09/23/2015    Stress incontinence, female    Urinary tract infection      Past Surgical History:   Procedure Laterality Date    CATARACT EXTRACTION      HYSTERECTOMY      KNEE SURGERY         General Visit Information:  General  Reason for Referral: AMS  Referred By: CAD, CKD, HTN, UTI  Past Medical History Relevant to Rehab: Devonte Lopez MD  Family/Caregiver Present: No  Prior to Session Communication: Bedside nurse  Patient Position Received: Up in chair, Alarm on  Preferred Learning Style: verbal, visual  General Comment: fairly talkative. Reports  in a locked dementia unit. 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  eats out ofter.  meets up with friends occasionally.    Precautions:       Vital Signs:  Vital Signs  Heart Rate: 83  SpO2: 96 %  Objective     Pain:  Pain Assessment  Pain Score: 0 - No pain    Cognition:  Cognition  Overall Cognitive Status: Within Functional Limits  Orientation Level: Oriented X4    General Assessments:  General Observation  General Observation: grossly steady walking.   Activity Tolerance  Endurance: Endurance does not limit participation in activity  Sensation  Light Touch: No apparent deficits  Strength  Strength Comments: 5/5=Quads. and a.tib. mm..     Coordination  Movements are Fluid and Coordinated: Yes  Postural Control  Postural Control: Within Functional Limits          Functional Assessments:        Transfers  Transfer: Yes  Transfer 1  Transfer From 1: Sit to, Stand  to  Transfer Device 1: Gait belt  Transfer Level of Assistance 1: Close supervision  Trials/Comments 1: x2  Ambulation/Gait Training  Ambulation/Gait Training Performed: Yes  Ambulation/Gait Training 1  Surface 1: Level tile  Gait Support Devices: Gait belt  Assistance 1: Close supervision  Comments/Distance (ft) 1: x 150 ft..                Outcome Measures:  [facility] Basic Mobility  Turning from your back to your side while in a flat bed without using bedrails: None  Moving from lying on your back to sitting on the side of a flat bed without using bedrails: None  Moving to and from bed to chair (including a wheelchair): None  Standing up from a chair using your arms (e.g. wheelchair or bedside chair): None  To walk in hospital room: None  Climbing 3-5 steps with railing: A little  Basic Mobility - Total Score: 23                            Goals:  Encounter Problems       Encounter Problems (Active)       Mobility       STG - Patient will ambulatex 250 ft. indep. and steady.   (Progressing)       Start:  10/21/23    Expected End:  11/04/23               Pain - Adult          Transfers       STG - Patient will transfer sit to and from stand with Modif. Indep. skill.   (Progressing)       Start:  10/21/23    Expected End:  11/04/23                 Education Documentation  Mobility Training, taught by Finesse Echeverria, PT at 10/21/2023  3:50 PM.  Learner: Patient  Readiness: Acceptance  Method: Demonstration  Response: Demonstrated Understanding, Needs Reinforcement    Education Comments  No comments found.

## 2023-10-21 NOTE — PROGRESS NOTES
"Subjective Data:  Patient remains more clear headed but very anxious. No chest pain, shortness of breath, palpitations, dizziness or lightheadedness.     Overnight Events:    No cardiac events     Objective Data:  Last Recorded Vitals:  Vitals:    10/20/23 2342 10/21/23 0433 10/21/23 0819 10/21/23 1127   BP: (!) 118/49 121/68 105/60 154/80   BP Location: Left arm Right arm Left arm    Patient Position: Lying Lying Lying    Pulse: 82 70 56 87   Resp: 16 17 18 25   Temp:  36.5 °C (97.7 °F) 36.5 °C (97.7 °F) 36.8 °C (98.2 °F)   TempSrc:  Temporal Temporal    SpO2: 94% 97% 95% 97%   Weight:       Height:           Last Labs:  CBC - 10/21/2023:  5:50 AM  6.4 12.8 213    40.2      CMP - 10/21/2023:  5:50 AM  9.0 7.0 20 --- 1.0   _ 5.0 15 81      PTT - No results in last year.  _   _ _     TROPHS   Date/Time Value Ref Range Status   10/20/2023 05:54 AM 45 0 - 13 ng/L Final   10/19/2023 10:45 PM 55 0 - 13 ng/L Final   10/19/2023 06:30 PM 33 0 - 13 ng/L Final      Last I/O:  I/O last 3 completed shifts:  In: 240 (3.2 mL/kg) [P.O.:240]  Out: - (0 mL/kg)   Weight: 74.8 kg     Past Cardiology Tests (Last 3 Years):  EKG: SR - no change  ECG 12 lead 10/20/2023    Echo:  No results found for this or any previous visit from the past 1095 days.  Requested yesterday - not yet done  Ejection Fractions:  No results found for: \"EF\"  Cath:  No results found for this or any previous visit from the past 1095 days.    Stress Test:  No results found for this or any previous visit from the past 1095 days.    Cardiac Imaging:  No results found for this or any previous visit from the past 1095 days.      Inpatient Medications:  Scheduled medications   Medication Dose Route Frequency    amLODIPine  10 mg oral Daily    [START ON 10/22/2023] buPROPion XL  150 mg oral Daily    enoxaparin  40 mg subcutaneous Daily    icosapent ethyL  1 capsule oral Daily    melatonin  3 mg oral Daily    metoprolol succinate XL  25 mg oral BID    mirtazapine  7.5 mg " oral Nightly    pneumococcal conjugate  0.5 mL intramuscular During hospitalization    polyethylene glycol  17 g oral Daily    rosuvastatin  10 mg oral Nightly     PRN medications   Medication    acetaminophen    Or    acetaminophen    Or    acetaminophen     Continuous Medications   Medication Dose Last Rate       Physical Exam:  GEN: WN woman in NAD  Neck: without JVD  Resp: nonlabored respirations, clear  CV: PMI nondisplaced, RRR with normal S1 and S2, no S3. Tele- SR  ABD: soft, NT, ND  Ext: without edema, mild OA changes  Neuro: alert and oriented, nonfocal  Psych: anxious, less pressure speech     Assessment/Plan   Abnormal EKG - not able to see old [facility]  Mild flat troponin elevation - no history to support ACS. Echo not yet done. Ideally would like to have prior to DC to make sure no RWMA or LV dysfunction but as patient has no angina or clinical failure - psychiatric treatment is most important. Reinforced with family that she should followup with cardiology and have risk-stratification with stress testing.     In the absence of symptoms she could be transferred to psychiatric facility for treatment with outpatient follow-up with Cardiology. Patient has voiced her desire to follow-up with Dr. Glez at .     Will report back once echo done or if any problems or questions - please call.     Peripheral IV 10/19/23 20 G Left;Anterior Hand (Active)   Site Assessment Clean;Dry;Intact 10/21/23 0800   Dressing Type Transparent 10/21/23 0800   Line Status Flushed 10/21/23 0800   Dressing Status Clean;Dry 10/21/23 0800   Number of days: 2       Code Status:  Full Code    I spent 35 minutes in the professional and overall care of this patient.        Jordana Pérez MD

## 2023-10-21 NOTE — CARE PLAN
Problem: Mobility  Goal: STG - Patient will ambulatex 250 ft. indep. and steady.    Outcome: Progressing     Problem: Transfers  Goal: STG - Patient will transfer sit to and from stand with Modif. Indep. skill.    Outcome: Progressing

## 2023-10-21 NOTE — PROGRESS NOTES
"Charisse Acharya is a 78 y.o. female on day 1 of admission presenting with Altered mental status, unspecified altered mental status type.      Subjective     No overnight events      Current Facility-Administered Medications:     acetaminophen (Tylenol) tablet 650 mg, 650 mg, oral, q4h PRN **OR** acetaminophen (Tylenol) oral liquid 650 mg, 650 mg, oral, q4h PRN **OR** acetaminophen (Tylenol) suppository 650 mg, 650 mg, rectal, q4h PRN, Carol Mayo PA-C    amLODIPine (Norvasc) tablet 10 mg, 10 mg, oral, Daily, Deepa R Mcdonald, APRN-CNP, 10 mg at 10/21/23 0923    [START ON 10/22/2023] buPROPion XL (Wellbutrin XL) 24 hr tablet 150 mg, 150 mg, oral, Daily, Anand Garcia MD    enoxaparin (Lovenox) syringe 40 mg, 40 mg, subcutaneous, Daily, Deepa R Mcdonald, APRN-CNP, 40 mg at 10/21/23 0923    icosapent ethyL (Vascepa) capsule 1 g, 1 capsule, oral, Daily, Deepa R Mcdonald, APRN-CNP, 1 g at 10/21/23 0926    melatonin tablet 3 mg, 3 mg, oral, Daily, Carol Mayo PA-C, 3 mg at 10/20/23 2210    metoprolol succinate XL (Toprol-XL) 24 hr tablet 25 mg, 25 mg, oral, BID, Deepa R Mcdonald, APRN-CNP, 25 mg at 10/21/23 0923    mirtazapine (Remeron) tablet 7.5 mg, 7.5 mg, oral, Nightly, Anand Garcia MD    pneumococcal conjugate vaccine, 13-valent (PREVNAR 13), 0.5 mL, intramuscular, During hospitalization, Devonte Lopez MD    polyethylene glycol (Glycolax, Miralax) packet 17 g, 17 g, oral, Daily, Carol Mayo PA-C    rosuvastatin (Crestor) tablet 10 mg, 10 mg, oral, Nightly, Deepa R Mcdonald, APRN-CNP, 10 mg at 10/20/23 2212         Objective     Last Recorded Vitals  Blood pressure 105/60, pulse 56, temperature 36.5 °C (97.7 °F), resp. rate 18, height 1.651 m (5' 5\"), weight 74.8 kg (165 lb), SpO2 95 %.    Gen: NAD  Neuro:  --HIF: A&O X 3, repetition and naming intact  --CN:  PERRLA, EOMI, VFF, no visible facial asymmetry, facial sensation intact, no tongue or palatal deviation, " SCM intact  --Motor: Moves all 4 extremities equally; no focal deficits  --Sensory: Intact to light touch, intact to pinprick  --Reflex: 2+ symmetric, toes down  --Cerebellum: FTN and HTS intact  --Gait: Deferred     Relevant Results                    [place] Coma Scale  Best Eye Response: Spontaneous  Best Verbal Response: Oriented  Best Motor Response: Follows commands  [place] Coma Scale Score: 15                             Assessment/Plan   This patient currently has cardiac telemetry ordered; if you would like to modify or discontinue the telemetry order, click here to go to the orders activity to modify/discontinue the order.  Principal Problem:    Altered mental status, unspecified altered mental status type  Active Problems:    Abnormal EKG    Elevated troponin    Shortness of breath on exertion     Altered Mental Status  - reviewed psychiatry note - most likely secondary to mini psychotic break from increased dose of Wellbutrin  - no further neurological workup  - will sign off now; please page with additional questions      George Huddleston MD

## 2023-10-21 NOTE — CARE PLAN
Pt was transferred to [address] [place] [address] around 1500.  The patient is tearful and anxious speaking about her current life stressors within her family unit and increased depression.  Pt discussed her concerns with her medications and feeling that her family does not have time for her.  She states she feels guilty involving her son in her current care due to his own life stressors.  She did express fleeting thoughts of death with no intent or plan.  She has asked about resources for changing her current POA and social work is consulted.  Safety has been maintained since her arrival.  She denies any current thoughts of harming herself or others.  She is hopeful with a new medication regimen and therapist she can improve her current situation.

## 2023-10-21 NOTE — PROGRESS NOTES
Charisse Acharya is a 78 y.o. female on day 1 of admission presenting with Altered mental status, unspecified altered mental status type.    Subjective   Denies any CP/SOB, sitting in chair, no acute distrerss       Physical Exam  HENT:      Head: Normocephalic.   Eyes:      Conjunctiva/sclera: Conjunctivae normal.   Cardiovascular:      Rate and Rhythm: Regular rhythm.   Pulmonary:      Breath sounds: Normal breath sounds.   Abdominal:      General: Bowel sounds are normal.      Palpations: Abdomen is soft.   Musculoskeletal:         General: Normal range of motion.   Skin:     General: Skin is warm and dry.   Neurological:      General: No focal deficit present.      Mental Status: She is alert.   Psychiatric:         Behavior: Behavior normal.         Objective     REVIEW OF SYSTEMS  remains little confused, no CP/SOB      Medications:       Current Facility-Administered Medications:     acetaminophen (Tylenol) tablet 650 mg, 650 mg, oral, q4h PRN **OR** acetaminophen (Tylenol) oral liquid 650 mg, 650 mg, oral, q4h PRN **OR** acetaminophen (Tylenol) suppository 650 mg, 650 mg, rectal, q4h PRN, Carol Mayo PA-C    amLODIPine (Norvasc) tablet 10 mg, 10 mg, oral, Daily, Deepa ALVINO Mcdonald APRN-CNP, 10 mg at 10/22/23 0953    buPROPion XL (Wellbutrin XL) 24 hr tablet 150 mg, 150 mg, oral, Daily, Anand Garcia MD, 150 mg at 10/22/23 0953    enoxaparin (Lovenox) syringe 40 mg, 40 mg, subcutaneous, Daily, Deepa ALVINO Mcdonald APRN-CNP, 40 mg at 10/22/23 0953    icosapent ethyL (Vascepa) capsule 1 g, 1 capsule, oral, Daily, Deepa R Mcdonald, APRN-CNP, 1 g at 10/22/23 0953    melatonin tablet 3 mg, 3 mg, oral, Daily, Carol Mayo PA-C, 3 mg at 10/21/23 2022    metoprolol succinate XL (Toprol-XL) 24 hr tablet 25 mg, 25 mg, oral, BID, Deepa R Tamara APRN-CNP, 25 mg at 10/22/23 0953    mirtazapine (Remeron) tablet 7.5 mg, 7.5 mg, oral, Nightly, Anand Garcia MD, 7.5 mg at 10/21/23 2022     "pneumococcal conjugate vaccine, 13-valent (PREVNAR 13), 0.5 mL, intramuscular, During hospitalization, Devonte Lopez MD    polyethylene glycol (Glycolax, Miralax) packet 17 g, 17 g, oral, Daily, Carol Mayo PA-C    rosuvastatin (Crestor) tablet 10 mg, 10 mg, oral, Nightly, ANIA Jolly-CNP, 10 mg at 10/21/23 2022         Results for orders placed or performed during the hospital encounter of 10/19/23 (from the past 24 hour(s))   CBC   Result Value Ref Range    WBC 6.0 4.4 - 11.3 x10*3/uL    nRBC 0.0 0.0 - 0.0 /100 WBCs    RBC 4.88 4.00 - 5.20 x10*6/uL    Hemoglobin 13.5 12.0 - 16.0 g/dL    Hematocrit 42.4 36.0 - 46.0 %    MCV 87 80 - 100 fL    MCH 27.7 26.0 - 34.0 pg    MCHC 31.8 (L) 32.0 - 36.0 g/dL    RDW 12.4 11.5 - 14.5 %    Platelets 214 150 - 450 x10*3/uL    MPV 9.7 7.5 - 11.5 fL   Basic metabolic panel   Result Value Ref Range    Glucose 79 74 - 99 mg/dL    Sodium 142 136 - 145 mmol/L    Potassium 3.3 (L) 3.5 - 5.3 mmol/L    Chloride 104 98 - 107 mmol/L    Bicarbonate 29 21 - 32 mmol/L    Anion Gap 12 10 - 20 mmol/L    Urea Nitrogen 23 6 - 23 mg/dL    Creatinine 1.10 (H) 0.50 - 1.05 mg/dL    eGFR 51 (L) >60 mL/min/1.73m*2    Calcium 9.3 8.6 - 10.3 mg/dL   Magnesium   Result Value Ref Range    Magnesium 2.23 1.60 - 2.40 mg/dL      Last Recorded Vitals  Blood pressure 129/75, pulse 81, temperature 37 °C (98.6 °F), resp. rate 18, height 1.651 m (5' 5\"), weight 74.8 kg (165 lb), SpO2 96 %.    Intake/Output last 3 Shifts:  I/O last 3 completed shifts:  In: 240 (3.2 mL/kg) [P.O.:240]  Out: - (0 mL/kg)   Weight: 74.8 kg              Assessment/Plan   Principal Problem:    Altered mental status, unspecified altered mental status type  Active Problems:    Abnormal EKG    Elevated troponin    Shortness of breath on exertion  JONA         PLAN: Pt remains little confused but is better than before, her symptoms could have been due to Wellbutrin, appreciate input of Psych, follow " closely.    Devonte Lopez MD

## 2023-10-21 NOTE — CARE PLAN
Pt A&Ox4 and febrile throughout shift. Pt ambulates x1 standby without equipment. Pt voiding appropriately. Pt on tele, ranging from NSR to ST. On room air, satting appropriately. Pt currently resting in bed, lowest and locked position, with bed alarm on and call light within reach. Will continue to monitor for remainder of shift.         Problem: Sensory Perceptual Alteration as Evidenced by  Goal: Cooperates with admission process  Outcome: Progressing  Goal: Patient/Family participate in treatment and discharge plans  Outcome: Progressing  Goal: Patient/Family verbalizes awareness of resources  Outcome: Progressing  Goal: Participates in unit activities  Outcome: Progressing  Goal: Discusses signs/symptoms of illness/treatment options  Outcome: Progressing  Goal: Initiates reality-based interactions  Outcome: Progressing  Goal: Able to discuss content of hallucinations/delusions  Outcome: Progressing  Goal: Notifies staff when experiencing hallucinations/delusions  Outcome: Progressing  Goal: Verbalizes reduction in hallucinations/delusions  Outcome: Progressing  Goal: Will not act on psychotic perception  Outcome: Progressing  Goal: Understands least restrictive measures  Outcome: Progressing  Goal: Free from restraint events  Outcome: Progressing     Problem: Potential for Harm to Self or Others  Goal: Cooperates with admission process  Outcome: Progressing  Goal: Participates in unit activities  Outcome: Progressing  Goal: Patient/Family participate in treatment and discharge plans  Outcome: Progressing  Goal: Identifies deescalation techniques  Outcome: Progressing  Goal: Understands least restrictive measures  Outcome: Progressing  Goal: Identifies stressors that lead to harmful behaviors  Outcome: Progressing  Goal: Notifies staff when experiencing harmful thoughts toward self/others  Outcome: Progressing  Goal: Denies harm toward self or others  Outcome: Progressing  Goal: Free from restraint  events  Outcome: Progressing     Problem: Educational/Scholastic Disruption  Goal: Meets educational requirements during hospitalization  Outcome: Progressing  Goal: Attends class without disruptive behavior  Outcome: Progressing  Goal: Completes daily assignments  Outcome: Progressing     Problem: Ineffective Coping  Goal: Cooperates with admission process  Outcome: Progressing  Goal: Identifies ineffective coping skills  Outcome: Progressing  Goal: Identifies healthy coping skills  Outcome: Progressing  Goal: Demonstrates healthy coping skills  Outcome: Progressing  Goal: Participates in unit activities  Outcome: Progressing  Goal: Patient/Family participate in treatment and discharge plans  Outcome: Progressing  Goal: Patient/Family verbalizes awareness of resources  Outcome: Progressing  Goal: Understands least restrictive measures  Outcome: Progressing  Goal: Free from restraint events  Outcome: Progressing     Problem: Pain - Adult  Goal: Verbalizes/displays adequate comfort level or baseline comfort level  Outcome: Progressing     Problem: Safety - Adult  Goal: Free from fall injury  Outcome: Progressing     Problem: Discharge Planning  Goal: Discharge to home or other facility with appropriate resources  Outcome: Progressing     Problem: Chronic Conditions and Co-morbidities  Goal: Patient's chronic conditions and co-morbidity symptoms are monitored and maintained or improved  Outcome: Progressing     Problem: Fall/Injury  Goal: Not fall by end of shift  Outcome: Progressing  Goal: Be free from injury by end of the shift  Outcome: Progressing  Goal: Verbalize understanding of personal risk factors for fall in the hospital  Outcome: Progressing  Goal: Verbalize understanding of risk factor reduction measures to prevent injury from fall in the home  Outcome: Progressing  Goal: Use assistive devices by end of the shift  Outcome: Progressing  Goal: Pace activities to prevent fatigue by end of the shift  Outcome:  Progressing

## 2023-10-21 NOTE — PROGRESS NOTES
Referring Provider  Dr Lopez    History Of Present Illness  Charisse Acharya is a 78 y.o. female presenting with depression and anxiety.  Patient is  currently living alone 2 adult children who are supportive with her  at a nursing home with dementia and behavioral disturbances    Patient has been feeling overwhelmed with the care of her  who was recently moved to long-term treatment in the dementia facility.  He continues to remain argumentative with the staff and has been calling her multiple times per day which was affecting the patient.  Patient feels guilty about not answering his call but at the same time she is tired.  Patient has been dealing with all the other day today activities like banking finance housework all by herself.    She has been feeling increasingly depressed rating her mood to be around 6 out of 10 with 10 being bad.  She has hopeless and worthless feeling at times but more so she feels helpless.  She denies having active suicidal thoughts currently but over the past month she has had at least 1 or 2 times fleeting thoughts that she would be better off dead.  But she thinks that her [employer]ism belief, her duty to take care of her  keep her going.  She does not think that her current medication is helping her.  She has been on Wellbutrin 150 mg for many years and Effexor 37.5 mg was started about a year ago.  She could not tolerate the increased dose of Effexor and she blames her ongoing confusion and psychosis secondary to the Effexor.  However she did tell me that her Wellbutrin was increased last week from 150 mg to 300 mg.    Patient during the admission was very anxious paranoid and confused.  She was yelling and screaming at the treatment team to which she was very apologetic.  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    Patient currently denies any  audiovisual hallucinations or paranoid thoughts.  She denies any active suicidal thoughts.  Patient is markedly anxious.  She reported sleep to be disturbed with her appetite fluctuating.  Her concentration is poor.      Regarding her past psychiatric history as a mention about she has a long history of depression and anxiety.  She has never attempted suicide in the past.  She used to see a psychiatrist in the past but not anymore.  She sees a counseling person who is not very helpful    Patient denies any alcohol or drug use      Past Medical History  She has a past medical history of Anxiety, CAD (coronary artery disease), CKD (chronic kidney disease) stage 3, GFR 30-59 ml/min (CMS/[facility]), Depression, Hyperlipidemia, unspecified (09/23/2015), Hypertension, Overweight (09/23/2015), Personal history of other diseases of the circulatory system (09/23/2015), Personal history of other diseases of the digestive system (09/23/2015), Personal history of other endocrine, nutritional and metabolic disease (12/15/2015), Stress incontinence (female) (male) (09/23/2015), and Urinary tract infection.    Surgical History  She has a past surgical history that includes Hysterectomy; Cataract extraction; and Knee surgery.     Social History  She reports that she has never smoked. She has never used smokeless tobacco. She reports that she does not drink alcohol and does not use drugs.     Allergies  Patient has no known allergies.    Review of Systems    Psychiatric ROS - Adult  Anxiety: General Anxiety Disorder (JONA)JONA Behaviors: excessive anxiety/worry, difficulty concentrating, easily fatigued, irritability, and restlessness  Depression: anhedonia, appetite decreased, concentration, energy, helpless, and psychomotor retardation  Delirium: negative  Psychosis: negative  Hue: negative  Safety Issues: none  Psychiatric ROS Comment:         Mental Status Exam  General: Well-built  Appearance: Good eye contact  Attitude:  "Cooperative  Behavior: Normal psychomotor activity  Speech: Normal in rate and rhythm  Mood: Depressed  Affect: Restricted  Thought Process: Normal  Thought Content: Helpless but denies active suicidal thoughts  Thought Perception: Normal  Cognition: Normal  Insight: Fair  Judgement: Fair    Psychiatric Risk Assessment  Violence Risk Assessment: none  Acute Risk of Harm to Others is Considered: low   Suicide Risk Assessment: age > 65 yrs old, , current psychiatric illness, and severe anxiety  Protective Factors against Suicide: adherence to  treatment, fear of social disapproval, hopefulness/future orientation, moral objections to suicide, and Religion affiliation/spirituality  Acute Risk of Harm to Self is Considered: low    Last Recorded Vitals  Blood pressure 105/60, pulse 56, temperature 36.5 °C (97.7 °F), resp. rate 18, height 1.651 m (5' 5\"), weight 74.8 kg (165 lb), SpO2 95 %.    Relevant Results  Results for orders placed or performed during the hospital encounter of 10/19/23 (from the past 96 hour(s))   CBC with Differential   Result Value Ref Range    WBC 7.2 4.4 - 11.3 x10*3/uL    nRBC 0.0 0.0 - 0.0 /100 WBCs    RBC 5.22 (H) 4.00 - 5.20 x10*6/uL    Hemoglobin 14.5 12.0 - 16.0 g/dL    Hematocrit 44.2 36.0 - 46.0 %    MCV 85 80 - 100 fL    MCH 27.8 26.0 - 34.0 pg    MCHC 32.8 32.0 - 36.0 g/dL    RDW 12.0 11.5 - 14.5 %    Platelets 198 150 - 450 x10*3/uL    MPV 9.7 7.5 - 11.5 fL    Neutrophils % 75.7 40.0 - 80.0 %    Immature Granulocytes %, Automated 0.1 0.0 - 0.9 %    Lymphocytes % 16.3 13.0 - 44.0 %    Monocytes % 7.3 2.0 - 10.0 %    Eosinophils % 0.3 0.0 - 6.0 %    Basophils % 0.3 0.0 - 2.0 %    Neutrophils Absolute 5.42 1.60 - 5.50 x10*3/uL    Immature Granulocytes Absolute, Automated 0.01 0.00 - 0.50 x10*3/uL    Lymphocytes Absolute 1.17 0.80 - 3.00 x10*3/uL    Monocytes Absolute 0.52 0.05 - 0.80 x10*3/uL    Eosinophils Absolute 0.02 0.00 - 0.40 x10*3/uL    Basophils Absolute 0.02 0.00 - 0.10 " x10*3/uL   Comprehensive Metabolic Panel   Result Value Ref Range    Glucose 122 (H) 74 - 99 mg/dL    Sodium 139 136 - 145 mmol/L    Potassium 3.6 3.5 - 5.3 mmol/L    Chloride 100 98 - 107 mmol/L    Bicarbonate 25 21 - 32 mmol/L    Anion Gap 18 10 - 20 mmol/L    Urea Nitrogen 17 6 - 23 mg/dL    Creatinine 1.03 0.50 - 1.05 mg/dL    eGFR 55 (L) >60 mL/min/1.73m*2    Calcium 9.8 8.6 - 10.3 mg/dL    Albumin 5.0 3.4 - 5.0 g/dL    Alkaline Phosphatase 81 33 - 136 U/L    Total Protein 7.0 6.4 - 8.2 g/dL    AST 20 9 - 39 U/L    Bilirubin, Total 1.0 0.0 - 1.2 mg/dL    ALT 15 7 - 45 U/L   TSH with reflex to Free T4 if abnormal   Result Value Ref Range    Thyroid Stimulating Hormone 3.68 0.44 - 3.98 mIU/L   Acute Toxicology Panel, Blood   Result Value Ref Range    Acetaminophen <10.0 10.0 - 30.0 ug/mL    Salicylate  <3 4 - 20 mg/dL    Alcohol <10 <=10 mg/dL   Magnesium   Result Value Ref Range    Magnesium 2.39 1.60 - 2.40 mg/dL   Troponin I, High Sensitivity, Initial   Result Value Ref Range    Troponin I, High Sensitivity 33 (H) 0 - 13 ng/L   Drug Screen, Urine   Result Value Ref Range    Amphetamine Screen, Urine Presumptive Negative Presumptive Negative    Barbiturate Screen, Urine Presumptive Negative Presumptive Negative    Benzodiazepines Screen, Urine Presumptive Negative Presumptive Negative    Cannabinoid Screen, Urine Presumptive Negative Presumptive Negative    Cocaine Metabolite Screen, Urine Presumptive Negative Presumptive Negative    Fentanyl Screen, Urine Presumptive Negative Presumptive Negative    Opiate Screen, Urine Presumptive Negative Presumptive Negative    Oxycodone Screen, Urine Presumptive Negative Presumptive Negative    PCP Screen, Urine Presumptive Negative Presumptive Negative   Urinalysis with Reflex Microscopic and Culture   Result Value Ref Range    Color, Urine Yellow Straw, Yellow    Appearance, Urine Hazy (N) Clear    Specific Gravity, Urine 1.018 1.005 - 1.035    pH, Urine 6.0 5.0, 5.5,  6.0, 6.5, 7.0, 7.5, 8.0    Protein, Urine 100 (2+) (N) NEGATIVE mg/dL    Glucose, Urine NEGATIVE NEGATIVE mg/dL    Blood, Urine SMALL (1+) (A) NEGATIVE    Ketones, Urine 20 (1+) (A) NEGATIVE mg/dL    Bilirubin, Urine NEGATIVE NEGATIVE    Urobilinogen, Urine <2.0 <2.0 mg/dL    Nitrite, Urine NEGATIVE NEGATIVE    Leukocyte Esterase, Urine NEGATIVE NEGATIVE   Extra Urine Gray Tube   Result Value Ref Range    Extra Tube Hold for add-ons.    Urinalysis Microscopic   Result Value Ref Range    WBC, Urine 1-5 1-5, NONE /HPF    RBC, Urine >20 (A) NONE, 1-2, 3-5 /HPF    Hyaline Casts, Urine 2+ (A) NONE /LPF   Troponin I, High Sensitivity   Result Value Ref Range    Troponin I, High Sensitivity 55 (HH) 0 - 13 ng/L   Troponin I, High Sensitivity   Result Value Ref Range    Troponin I, High Sensitivity 45 (H) 0 - 13 ng/L   Ammonia   Result Value Ref Range    Ammonia 32 16 - 53 umol/L   CBC   Result Value Ref Range    WBC 8.2 4.4 - 11.3 x10*3/uL    nRBC 0.0 0.0 - 0.0 /100 WBCs    RBC 5.05 4.00 - 5.20 x10*6/uL    Hemoglobin 14.0 12.0 - 16.0 g/dL    Hematocrit 43.1 36.0 - 46.0 %    MCV 85 80 - 100 fL    MCH 27.7 26.0 - 34.0 pg    MCHC 32.5 32.0 - 36.0 g/dL    RDW 12.3 11.5 - 14.5 %    Platelets 229 150 - 450 x10*3/uL    MPV 9.6 7.5 - 11.5 fL   Basic Metabolic Panel   Result Value Ref Range    Glucose 109 (H) 74 - 99 mg/dL    Sodium 139 136 - 145 mmol/L    Potassium 3.9 3.5 - 5.3 mmol/L    Chloride 103 98 - 107 mmol/L    Bicarbonate 26 21 - 32 mmol/L    Anion Gap 14 10 - 20 mmol/L    Urea Nitrogen 24 (H) 6 - 23 mg/dL    Creatinine 1.07 (H) 0.50 - 1.05 mg/dL    eGFR 53 (L) >60 mL/min/1.73m*2    Calcium 9.8 8.6 - 10.3 mg/dL   Magnesium   Result Value Ref Range    Magnesium 2.49 (H) 1.60 - 2.40 mg/dL   ECG 12 lead   Result Value Ref Range    Ventricular Rate 95 BPM    Atrial Rate 95 BPM    NE Interval 162 ms    QRS Duration 110 ms    QT Interval 390 ms    QTC Calculation(Bazett) 490 ms    P Axis 41 degrees    R Axis -65 degrees    T  Axis 3 degrees    QRS Count 15 beats    Q Onset 209 ms    P Onset 128 ms    P Offset 202 ms    T Offset 404 ms    QTC Fredericia 454 ms   CBC   Result Value Ref Range    WBC 6.4 4.4 - 11.3 x10*3/uL    nRBC 0.0 0.0 - 0.0 /100 WBCs    RBC 4.60 4.00 - 5.20 x10*6/uL    Hemoglobin 12.8 12.0 - 16.0 g/dL    Hematocrit 40.2 36.0 - 46.0 %    MCV 87 80 - 100 fL    MCH 27.8 26.0 - 34.0 pg    MCHC 31.8 (L) 32.0 - 36.0 g/dL    RDW 12.4 11.5 - 14.5 %    Platelets 213 150 - 450 x10*3/uL    MPV 9.8 7.5 - 11.5 fL   Basic metabolic panel   Result Value Ref Range    Glucose 99 74 - 99 mg/dL    Sodium 141 136 - 145 mmol/L    Potassium 3.5 3.5 - 5.3 mmol/L    Chloride 106 98 - 107 mmol/L    Bicarbonate 27 21 - 32 mmol/L    Anion Gap 12 10 - 20 mmol/L    Urea Nitrogen 21 6 - 23 mg/dL    Creatinine 1.03 0.50 - 1.05 mg/dL    eGFR 55 (L) >60 mL/min/1.73m*2    Calcium 9.0 8.6 - 10.3 mg/dL   Magnesium   Result Value Ref Range    Magnesium 2.37 1.60 - 2.40 mg/dL   TSH   Result Value Ref Range    Thyroid Stimulating Hormone 4.19 (H) 0.44 - 3.98 mIU/L   T4, free   Result Value Ref Range    Thyroxine, Free 1.22 (H) 0.61 - 1.12 ng/dL     ECG 12 lead    Result Date: 10/20/2023  Sinus rhythm with Premature atrial complexes Incomplete right bundle branch block Left anterior fascicular block ST & T wave abnormality, consider anterior ischemia Prolonged QT Abnormal ECG When compared with ECG of 19-OCT-2023 18:57, (unconfirmed) No significant change was found Confirmed by Jordana Pérez ([id number]) on 10/20/2023 11:31:09 PM    XR chest 1 view    Result Date: 10/19/2023  STUDY: Chest Radiograph;  10/19/2023 5:49 PM. INDICATION: Altered mental status. COMPARISON: None Available. ACCESSION NUMBER(S): [id number] ORDERING CLINICIAN: MAX BEDOYA TECHNIQUE:  Frontal chest was obtained at 18:30 hours. FINDINGS: Both lungs are clear. The heart and mediastinum are of normal size and contour.  No pleural effusion.  No pneumothorax. No acute bony abnormality  identified.    No findings of an acute cardiopulmonary process. Signed by Reymundo Fierro MD    CT head wo IV contrast    Result Date: 10/19/2023  Interpreted By:  Janna Hayes, STUDY: CT HEAD WO IV CONTRAST;  10/19/2023 6:33 pm   INDICATION: AMS, word salad.   COMPARISON: None.   ACCESSION NUMBER(S): [id number]   ORDERING CLINICIAN: MAX BEDOYA   TECHNIQUE: Axial noncontrast CT images of the head.   FINDINGS: BRAIN PARENCHYMA: Gray-white matter interfaces are preserved. No mass effect or midline shift. Mild nonspecific white matter changes and parenchymal volume loss.   HEMORRHAGE: No acute intracranial hemorrhage. VENTRICLES and EXTRA-AXIAL SPACES: The ventricles, sulci and basal cisterns enlarged, concordant with parenchymal volume loss. EXTRACRANIAL SOFT TISSUES: Within normal limits. PARANASAL SINUSES/MASTOIDS: The visualized paranasal sinuses and mastoid air cells are aerated. CALVARIUM: No depressed skull fracture. No destructive osseous lesion.   OTHER FINDINGS: None.       No acute intracranial hemorrhage or mass effect.   Mild nonspecific white matter changes and parenchymal volume loss.     MACRO: None.   Signed by: Janna Hayes 10/19/2023 7:13 PM Dictation workstation:   [id number]    XR knee    Result Date: 9/29/2023  Interpreted By:  KIRAN FORD MD MRN: [id number] Patient Name: ALEXANDRE SANTIAGO  STUDY: Left knee dated  9/29/2023.  INDICATION: s/p TKR  Z96.652: Status post total left knee replacement  COMPARISON: Radiographs dated 04/01/2022.  ACCESSION NUMBER(S): [id number]  ORDERING CLINICIAN: SIMON MONTANA  TECHNIQUE: Three views of the Left knee.  FINDINGS: No fracture or dislocation is evident.  No knee effusion is evident. There is a total knee arthroplasty. Hardware is intact as visualized. No soft tissue gas or radiopaque foreign body is evident.      No osseous injury or hardware complication is evident.  MACRO: None               Assessment/Plan major depressive disorder recurrent  moderate  Generalized anxiety disorder  Principal Problem:    Altered mental status, unspecified altered mental status type  Active Problems:    Abnormal EKG    Elevated troponin    Shortness of breath on exertion      I believe that a recent dose adjustment of Wellbutrin from 150 mg to 300 mg could have triggered a mini psychotic break and confusion.  I recommend to decrease the Wellbutrin back to 150 mg daily the dose that she was comfortably taking for many years  However she is clinically depressed.  She does not like the Effexor that she has been taking which was causing her nightmares and multiple other side effects.  She wants to come off the Effexor.  And therefore planning to stop the Effexor since she is on a very low-dose.  I have substituted Remeron 7.5 mg at night for depression and sleep  D/W patient about IP admission to [facility] to review her medication. Pt does not want to go IP but instead would like to try OP treatment. Gave permission to talk to her daughter  Patient is not at imminent risk of suicide but she will need psychiatric follow-up on discharge from the hospital.  I would recommend patient to contact Dr. Blackman office in [place] for follow-up.  Please provide Dr. Doe Campbell's office phone number to the patient so that she can call and schedule an appointment  We will continue to follow her during the stay in the hospital     Medication Consent  Medication Consent: risks, benefits, side effects reviewed for all ordered meds    Anand Garcia MD      ADDENDUM:    I was informed after the assessment that patient continue to talk about suicide without any plan    Please do not discharge her today. Plan to talk to her daughter today and see her again tomorrow to make final decision reg [facility] vs IP admit       10/21/23 at 11:27 AM - Anand Garcia MD

## 2023-10-22 PROBLEM — R79.89 ELEVATED TROPONIN: Status: RESOLVED | Noted: 2023-10-20 | Resolved: 2023-10-22

## 2023-10-22 PROBLEM — R06.02 SHORTNESS OF BREATH ON EXERTION: Chronic | Status: RESOLVED | Noted: 2023-10-20 | Resolved: 2023-10-22

## 2023-10-22 PROBLEM — R94.31 ABNORMAL EKG: Status: RESOLVED | Noted: 2023-10-20 | Resolved: 2023-10-22

## 2023-10-22 PROBLEM — R41.82 ALTERED MENTAL STATUS, UNSPECIFIED ALTERED MENTAL STATUS TYPE: Status: RESOLVED | Noted: 2023-10-20 | Resolved: 2023-10-22

## 2023-10-22 LAB
ANION GAP SERPL CALC-SCNC: 12 MMOL/L (ref 10–20)
BUN SERPL-MCNC: 23 MG/DL (ref 6–23)
CALCIUM SERPL-MCNC: 9.3 MG/DL (ref 8.6–10.3)
CHLORIDE SERPL-SCNC: 104 MMOL/L (ref 98–107)
CO2 SERPL-SCNC: 29 MMOL/L (ref 21–32)
CREAT SERPL-MCNC: 1.1 MG/DL (ref 0.5–1.05)
ERYTHROCYTE [DISTWIDTH] IN BLOOD BY AUTOMATED COUNT: 12.4 % (ref 11.5–14.5)
GFR SERPL CREATININE-BSD FRML MDRD: 51 ML/MIN/1.73M*2
GLUCOSE SERPL-MCNC: 79 MG/DL (ref 74–99)
HCT VFR BLD AUTO: 42.4 % (ref 36–46)
HGB BLD-MCNC: 13.5 G/DL (ref 12–16)
MAGNESIUM SERPL-MCNC: 2.23 MG/DL (ref 1.6–2.4)
MCH RBC QN AUTO: 27.7 PG (ref 26–34)
MCHC RBC AUTO-ENTMCNC: 31.8 G/DL (ref 32–36)
MCV RBC AUTO: 87 FL (ref 80–100)
NRBC BLD-RTO: 0 /100 WBCS (ref 0–0)
PLATELET # BLD AUTO: 214 X10*3/UL (ref 150–450)
PMV BLD AUTO: 9.7 FL (ref 7.5–11.5)
POTASSIUM SERPL-SCNC: 3.3 MMOL/L (ref 3.5–5.3)
RBC # BLD AUTO: 4.88 X10*6/UL (ref 4–5.2)
SODIUM SERPL-SCNC: 142 MMOL/L (ref 136–145)
WBC # BLD AUTO: 6 X10*3/UL (ref 4.4–11.3)

## 2023-10-22 PROCEDURE — 80051 ELECTROLYTE PANEL: CPT | Performed by: PHYSICIAN ASSISTANT

## 2023-10-22 PROCEDURE — 2500000001 HC RX 250 WO HCPCS SELF ADMINISTERED DRUGS (ALT 637 FOR MEDICARE OP): Performed by: PHYSICIAN ASSISTANT

## 2023-10-22 PROCEDURE — 2500000004 HC RX 250 GENERAL PHARMACY W/ HCPCS (ALT 636 FOR OP/ED): Performed by: NURSE PRACTITIONER

## 2023-10-22 PROCEDURE — 96372 THER/PROPH/DIAG INJ SC/IM: CPT | Performed by: NURSE PRACTITIONER

## 2023-10-22 PROCEDURE — 1200000002 HC GENERAL ROOM WITH TELEMETRY DAILY

## 2023-10-22 PROCEDURE — 99232 SBSQ HOSP IP/OBS MODERATE 35: CPT | Performed by: PSYCHIATRY & NEUROLOGY

## 2023-10-22 PROCEDURE — 85027 COMPLETE CBC AUTOMATED: CPT | Performed by: PHYSICIAN ASSISTANT

## 2023-10-22 PROCEDURE — 36415 COLL VENOUS BLD VENIPUNCTURE: CPT | Performed by: PHYSICIAN ASSISTANT

## 2023-10-22 PROCEDURE — 83735 ASSAY OF MAGNESIUM: CPT | Performed by: PHYSICIAN ASSISTANT

## 2023-10-22 PROCEDURE — 2500000001 HC RX 250 WO HCPCS SELF ADMINISTERED DRUGS (ALT 637 FOR MEDICARE OP): Performed by: PSYCHIATRY & NEUROLOGY

## 2023-10-22 PROCEDURE — 2500000001 HC RX 250 WO HCPCS SELF ADMINISTERED DRUGS (ALT 637 FOR MEDICARE OP): Performed by: NURSE PRACTITIONER

## 2023-10-22 RX ORDER — BUPROPION HYDROCHLORIDE 150 MG/1
150 TABLET ORAL DAILY
Qty: 30 TABLET | Refills: 0 | Status: SHIPPED | OUTPATIENT
Start: 2023-10-23

## 2023-10-22 RX ORDER — MIRTAZAPINE 7.5 MG/1
7.5 TABLET, FILM COATED ORAL NIGHTLY
Qty: 30 TABLET | Refills: 0 | Status: SHIPPED | OUTPATIENT
Start: 2023-10-22

## 2023-10-22 RX ADMIN — BUPROPION HYDROCHLORIDE 150 MG: 150 TABLET, FILM COATED, EXTENDED RELEASE ORAL at 09:53

## 2023-10-22 RX ADMIN — METOPROLOL SUCCINATE 25 MG: 25 TABLET, EXTENDED RELEASE ORAL at 09:53

## 2023-10-22 RX ADMIN — AMLODIPINE BESYLATE 10 MG: 10 TABLET ORAL at 09:53

## 2023-10-22 RX ADMIN — Medication 3 MG: at 20:33

## 2023-10-22 RX ADMIN — ICOSAPENT ETHYL 1 G: 1 CAPSULE ORAL at 09:53

## 2023-10-22 RX ADMIN — ROSUVASTATIN CALCIUM 10 MG: 10 TABLET, FILM COATED ORAL at 20:34

## 2023-10-22 RX ADMIN — MIRTAZAPINE 7.5 MG: 15 TABLET, FILM COATED ORAL at 20:33

## 2023-10-22 RX ADMIN — METOPROLOL SUCCINATE 25 MG: 25 TABLET, EXTENDED RELEASE ORAL at 20:33

## 2023-10-22 RX ADMIN — ENOXAPARIN SODIUM 40 MG: 40 INJECTION SUBCUTANEOUS at 09:53

## 2023-10-22 SDOH — SOCIAL STABILITY: SOCIAL INSECURITY: HAVE YOU HAD THOUGHTS OF HARMING ANYONE ELSE?: NO

## 2023-10-22 SDOH — SOCIAL STABILITY: SOCIAL INSECURITY: DO YOU FEEL ANYONE HAS EXPLOITED OR TAKEN ADVANTAGE OF YOU FINANCIALLY OR OF YOUR PERSONAL PROPERTY?: NO

## 2023-10-22 SDOH — SOCIAL STABILITY: SOCIAL INSECURITY: ABUSE: ADULT

## 2023-10-22 SDOH — SOCIAL STABILITY: SOCIAL INSECURITY: DO YOU FEEL UNSAFE GOING BACK TO THE PLACE WHERE YOU ARE LIVING?: YES

## 2023-10-22 SDOH — SOCIAL STABILITY: SOCIAL INSECURITY: POSSIBLE ABUSE REPORTED TO:: OTHER (COMMENT)

## 2023-10-22 SDOH — SOCIAL STABILITY: SOCIAL INSECURITY: ARE YOU OR HAVE YOU BEEN THREATENED OR ABUSED PHYSICALLY, EMOTIONALLY, OR SEXUALLY BY ANYONE?: YES

## 2023-10-22 SDOH — SOCIAL STABILITY: SOCIAL INSECURITY: ARE THERE ANY APPARENT SIGNS OF INJURIES/BEHAVIORS THAT COULD BE RELATED TO ABUSE/NEGLECT?: NO

## 2023-10-22 SDOH — SOCIAL STABILITY: SOCIAL INSECURITY: HAS ANYONE EVER THREATENED TO HURT YOUR FAMILY OR YOUR PETS?: NO

## 2023-10-22 SDOH — SOCIAL STABILITY: SOCIAL INSECURITY: DOES ANYONE TRY TO KEEP YOU FROM HAVING/CONTACTING OTHER FRIENDS OR DOING THINGS OUTSIDE YOUR HOME?: YES

## 2023-10-22 ASSESSMENT — COGNITIVE AND FUNCTIONAL STATUS - GENERAL
DAILY ACTIVITIY SCORE: 24
CLIMB 3 TO 5 STEPS WITH RAILING: A LITTLE
MOBILITY SCORE: 23
DAILY ACTIVITIY SCORE: 24
MOBILITY SCORE: 24
DAILY ACTIVITIY SCORE: 24
MOBILITY SCORE: 24

## 2023-10-22 ASSESSMENT — ACTIVITIES OF DAILY LIVING (ADL)
BATHING: INDEPENDENT
HEARING - LEFT EAR: FUNCTIONAL
JUDGMENT_ADEQUATE_SAFELY_COMPLETE_DAILY_ACTIVITIES: NO
GROOMING: INDEPENDENT
FEEDING YOURSELF: INDEPENDENT
DRESSING YOURSELF: INDEPENDENT
PATIENT'S MEMORY ADEQUATE TO SAFELY COMPLETE DAILY ACTIVITIES?: NO
ADEQUATE_TO_COMPLETE_ADL: YES
HEARING - RIGHT EAR: FUNCTIONAL
WALKS IN HOME: INDEPENDENT
TOILETING: INDEPENDENT

## 2023-10-22 ASSESSMENT — LIFESTYLE VARIABLES
HOW OFTEN DO YOU HAVE A DRINK CONTAINING ALCOHOL: NEVER
HOW OFTEN DO YOU HAVE 6 OR MORE DRINKS ON ONE OCCASION: NEVER
AUDIT-C TOTAL SCORE: 0
PRESCIPTION_ABUSE_PAST_12_MONTHS: NO
SKIP TO QUESTIONS 9-10: 1
HOW MANY STANDARD DRINKS CONTAINING ALCOHOL DO YOU HAVE ON A TYPICAL DAY: PATIENT DOES NOT DRINK
AUDIT-C TOTAL SCORE: 0
SUBSTANCE_ABUSE_PAST_12_MONTHS: NO

## 2023-10-22 ASSESSMENT — PAIN SCALES - GENERAL
PAINLEVEL_OUTOF10: 0 - NO PAIN

## 2023-10-22 ASSESSMENT — PAIN - FUNCTIONAL ASSESSMENT: PAIN_FUNCTIONAL_ASSESSMENT: 0-10

## 2023-10-22 ASSESSMENT — PATIENT HEALTH QUESTIONNAIRE - PHQ9
2. FEELING DOWN, DEPRESSED OR HOPELESS: NOT AT ALL
SUM OF ALL RESPONSES TO PHQ9 QUESTIONS 1 & 2: 0
1. LITTLE INTEREST OR PLEASURE IN DOING THINGS: NOT AT ALL

## 2023-10-22 NOTE — PROGRESS NOTES
Spiritual Care Visit       I visited with patient per a request made by phone call from the floor nurse.  I spent considerable amount of time with patient as her anxiety was presenting through non-stop talking and subject jumping.  She has many different family issues that are causing her stress.  Her ability to concentrate on our conversation was very limited.  She had some memory issues (remembering my name, day of week, time) and was very distraught by this.  I encouraged her to use her tools (phone for day and time and I wrote my name down for her to look at).  I encouraged her to stay in counseling as she said she would not return to the existing one.  She conveyed many ill feelings toward her  who, she stated, is currently in a locked facility for memory issues.  She talked as if he completely controls her even since not living together.  I offered prayer and she accepted.  Upon leaving, patient would not stop sharing the same stories she had previous shared.  I had to keep re-directing and informed her that I was leaving.                                          Taxonomy  Intended Effects: Build relationship of care and support, Convey a calming presence, Demonstrate caring and concern, Lessen anxiety, Journeying with someone in the grief process, Helping someone feel comforted, Establish rapport and connectedness, Preserve dignity and respect, Promote sense of peace  Methods: Encourage self care, Encourage sharing of feelings, Encourage story-telling, Offer emotional support, Offer spiritual/Denominational support, Offer support

## 2023-10-22 NOTE — PROGRESS NOTES
Patient's vital signs were reviewed and the patient was briefly seen.  She was speaking with the hospital based [employer] counselor.    The patient was informed about the results of her echocardiogram that showed normal LV function without regional wall motion abnormalities and mild age-related valvular changes.  The patient was reminded that she needed to establish with a cardiologist on an outpatient basis of her choice and follow through with an outpatient stress test for risk stratification for coronary artery disease.  She has voiced her desire to establish with the [facility] cardiologist, in particular Dr. Glez.  We have assured the patient that he would be able to visualize her echocardiogram report through the current hospital electronic health record system and likely our notes as well.    Please call me if there are any further questions or concerns.

## 2023-10-22 NOTE — PROGRESS NOTES
Charisse Acharya is a 78 y.o. female on day 2 of admission presenting with Altered mental status, unspecified altered mental status type.    Subjective   remains little confused, but better, no cp/sob     Objective         Current Facility-Administered Medications:     acetaminophen (Tylenol) tablet 650 mg, 650 mg, oral, q4h PRN **OR** acetaminophen (Tylenol) oral liquid 650 mg, 650 mg, oral, q4h PRN **OR** acetaminophen (Tylenol) suppository 650 mg, 650 mg, rectal, q4h PRN, Carol Mayo PA-C    amLODIPine (Norvasc) tablet 10 mg, 10 mg, oral, Daily, CHETAN Jolly, 10 mg at 10/22/23 0953    buPROPion XL (Wellbutrin XL) 24 hr tablet 150 mg, 150 mg, oral, Daily, Anand Garcia MD, 150 mg at 10/22/23 0953    enoxaparin (Lovenox) syringe 40 mg, 40 mg, subcutaneous, Daily, ANIA Jolly-CNP, 40 mg at 10/22/23 0953    icosapent ethyL (Vascepa) capsule 1 g, 1 capsule, oral, Daily, ANIA Jolly-CNP, 1 g at 10/22/23 0953    melatonin tablet 3 mg, 3 mg, oral, Daily, Carol Mayo PA-C, 3 mg at 10/21/23 2022    metoprolol succinate XL (Toprol-XL) 24 hr tablet 25 mg, 25 mg, oral, BID, CHETAN Jolly, 25 mg at 10/22/23 0953    mirtazapine (Remeron) tablet 7.5 mg, 7.5 mg, oral, Nightly, Anand Garcia MD, 7.5 mg at 10/21/23 2022    pneumococcal conjugate vaccine, 13-valent (PREVNAR 13), 0.5 mL, intramuscular, During hospitalization, Devonte Lopez MD    polyethylene glycol (Glycolax, Miralax) packet 17 g, 17 g, oral, Daily, Carol Mayo PA-C    rosuvastatin (Crestor) tablet 10 mg, 10 mg, oral, Nightly, ANIA Jolly-CNP, 10 mg at 10/21/23 2022       Physical Exam  HENT:      Head: Normocephalic.   Eyes:      Conjunctiva/sclera: Conjunctivae normal.   Cardiovascular:      Rate and Rhythm: Regular rhythm.   Pulmonary:      Breath sounds: Normal breath sounds.   Abdominal:      General: Bowel sounds are normal.      Palpations: Abdomen  "is soft.   Musculoskeletal:         General: Normal range of motion.   Skin:     General: Skin is warm and dry.   Neurological:      General: No focal deficit present.      Mental Status: She is alert.   Psychiatric:         Behavior: Behavior normal.           Last Recorded Vitals  Blood pressure (!) 160/98, pulse 82, temperature 37.1 °C (98.8 °F), temperature source Temporal, resp. rate 16, height 1.651 m (5' 5\"), weight 74.8 kg (165 lb), SpO2 98 %.  Intake/Output last 3 Shifts:  I/O last 3 completed shifts:  In: 120 (1.6 mL/kg) [P.O.:120]  Out: - (0 mL/kg)   Weight: 74.8 kg     Labs:       Results for orders placed or performed during the hospital encounter of 10/19/23 (from the past 24 hour(s))   CBC   Result Value Ref Range    WBC 6.0 4.4 - 11.3 x10*3/uL    nRBC 0.0 0.0 - 0.0 /100 WBCs    RBC 4.88 4.00 - 5.20 x10*6/uL    Hemoglobin 13.5 12.0 - 16.0 g/dL    Hematocrit 42.4 36.0 - 46.0 %    MCV 87 80 - 100 fL    MCH 27.7 26.0 - 34.0 pg    MCHC 31.8 (L) 32.0 - 36.0 g/dL    RDW 12.4 11.5 - 14.5 %    Platelets 214 150 - 450 x10*3/uL    MPV 9.7 7.5 - 11.5 fL   Basic metabolic panel   Result Value Ref Range    Glucose 79 74 - 99 mg/dL    Sodium 142 136 - 145 mmol/L    Potassium 3.3 (L) 3.5 - 5.3 mmol/L    Chloride 104 98 - 107 mmol/L    Bicarbonate 29 21 - 32 mmol/L    Anion Gap 12 10 - 20 mmol/L    Urea Nitrogen 23 6 - 23 mg/dL    Creatinine 1.10 (H) 0.50 - 1.05 mg/dL    eGFR 51 (L) >60 mL/min/1.73m*2    Calcium 9.3 8.6 - 10.3 mg/dL   Magnesium   Result Value Ref Range    Magnesium 2.23 1.60 - 2.40 mg/dL              Assessment/Plan   Active Problems:  There are no active Hospital Problems.  AMS due to encephalopathy  HUNT  Abnormal EKG  Elevated troponin    PLAN: Pt is slowly improving, but remains little confused, per psych her symptoms could have been due to Wellbutrin, Ss working on dc plan, appreciate input of Psych, follow closely.           Devonte Lopez MD    "

## 2023-10-22 NOTE — PROGRESS NOTES
"Charisse Acharya is a 78 y.o. female on day 2 of admission presenting with Altered mental status, unspecified altered mental status type.    SUBJECTIVE:  Pt was having fleeting suicidal thoughts which was concerning yesterday. Tried reaching out to her daughter in [place]. Unable to talk to her. This morning able to talk to her son who lives locally and want to become her POA.    He said that she has declined significantly over the last 2-3 months with her mental health, feel that she is a burden to family and deserve to be dead.     Pt this morning report feeling better, however the nurse report that she was noticed to be significantly confused. Pt has been markedly anxious and fidgety. Helpless and hopeless feeling    Son agree to the plan of ROMAN gaspar admit to adjust her medication as well as to assess her memory which has been declining    Exam:  Vital Signs: /55 (BP Location: Right arm, Patient Position: Lying)   Pulse 55   Temp 36.5 °C (97.7 °F) (Temporal)   Resp 18   Ht 1.651 m (5' 5\")   Wt 74.8 kg (165 lb)   SpO2 99%   BMI 27.46 kg/m²   Musculoskeletal: Muscle tone: WNL  Gait/Station: normal      Mental Status Exam:  General Appearance: well built  Speech: normal volume  Mood: Depressed and anxious  Affect: emotionally labile  Thought Process: Linear, goal directed  Thought Associations: No loosening of associations  Thought Content: suicidal- fleeting, hopeless and worthless feeling  Perception: No perceptual abnormalities noted  Level of Consciousness: Alert  Orientation: Alert and oriented to person, place, time and situation  Attention and Concentration: Mild impairment in attention  Cognition:Impaired recent memory  Insight: fair  Judgment: fair     Results for orders placed or performed during the hospital encounter of 10/19/23 (from the past 96 hour(s))   CBC with Differential   Result Value Ref Range    WBC 7.2 4.4 - 11.3 x10*3/uL    nRBC 0.0 0.0 - 0.0 /100 WBCs    RBC 5.22 (H) 4.00 " - 5.20 x10*6/uL    Hemoglobin 14.5 12.0 - 16.0 g/dL    Hematocrit 44.2 36.0 - 46.0 %    MCV 85 80 - 100 fL    MCH 27.8 26.0 - 34.0 pg    MCHC 32.8 32.0 - 36.0 g/dL    RDW 12.0 11.5 - 14.5 %    Platelets 198 150 - 450 x10*3/uL    MPV 9.7 7.5 - 11.5 fL    Neutrophils % 75.7 40.0 - 80.0 %    Immature Granulocytes %, Automated 0.1 0.0 - 0.9 %    Lymphocytes % 16.3 13.0 - 44.0 %    Monocytes % 7.3 2.0 - 10.0 %    Eosinophils % 0.3 0.0 - 6.0 %    Basophils % 0.3 0.0 - 2.0 %    Neutrophils Absolute 5.42 1.60 - 5.50 x10*3/uL    Immature Granulocytes Absolute, Automated 0.01 0.00 - 0.50 x10*3/uL    Lymphocytes Absolute 1.17 0.80 - 3.00 x10*3/uL    Monocytes Absolute 0.52 0.05 - 0.80 x10*3/uL    Eosinophils Absolute 0.02 0.00 - 0.40 x10*3/uL    Basophils Absolute 0.02 0.00 - 0.10 x10*3/uL   Comprehensive Metabolic Panel   Result Value Ref Range    Glucose 122 (H) 74 - 99 mg/dL    Sodium 139 136 - 145 mmol/L    Potassium 3.6 3.5 - 5.3 mmol/L    Chloride 100 98 - 107 mmol/L    Bicarbonate 25 21 - 32 mmol/L    Anion Gap 18 10 - 20 mmol/L    Urea Nitrogen 17 6 - 23 mg/dL    Creatinine 1.03 0.50 - 1.05 mg/dL    eGFR 55 (L) >60 mL/min/1.73m*2    Calcium 9.8 8.6 - 10.3 mg/dL    Albumin 5.0 3.4 - 5.0 g/dL    Alkaline Phosphatase 81 33 - 136 U/L    Total Protein 7.0 6.4 - 8.2 g/dL    AST 20 9 - 39 U/L    Bilirubin, Total 1.0 0.0 - 1.2 mg/dL    ALT 15 7 - 45 U/L   TSH with reflex to Free T4 if abnormal   Result Value Ref Range    Thyroid Stimulating Hormone 3.68 0.44 - 3.98 mIU/L   Acute Toxicology Panel, Blood   Result Value Ref Range    Acetaminophen <10.0 10.0 - 30.0 ug/mL    Salicylate  <3 4 - 20 mg/dL    Alcohol <10 <=10 mg/dL   Magnesium   Result Value Ref Range    Magnesium 2.39 1.60 - 2.40 mg/dL   Troponin I, High Sensitivity, Initial   Result Value Ref Range    Troponin I, High Sensitivity 33 (H) 0 - 13 ng/L   Drug Screen, Urine   Result Value Ref Range    Amphetamine Screen, Urine Presumptive Negative Presumptive Negative     Barbiturate Screen, Urine Presumptive Negative Presumptive Negative    Benzodiazepines Screen, Urine Presumptive Negative Presumptive Negative    Cannabinoid Screen, Urine Presumptive Negative Presumptive Negative    Cocaine Metabolite Screen, Urine Presumptive Negative Presumptive Negative    Fentanyl Screen, Urine Presumptive Negative Presumptive Negative    Opiate Screen, Urine Presumptive Negative Presumptive Negative    Oxycodone Screen, Urine Presumptive Negative Presumptive Negative    PCP Screen, Urine Presumptive Negative Presumptive Negative   Urinalysis with Reflex Microscopic and Culture   Result Value Ref Range    Color, Urine Yellow Straw, Yellow    Appearance, Urine Hazy (N) Clear    Specific Gravity, Urine 1.018 1.005 - 1.035    pH, Urine 6.0 5.0, 5.5, 6.0, 6.5, 7.0, 7.5, 8.0    Protein, Urine 100 (2+) (N) NEGATIVE mg/dL    Glucose, Urine NEGATIVE NEGATIVE mg/dL    Blood, Urine SMALL (1+) (A) NEGATIVE    Ketones, Urine 20 (1+) (A) NEGATIVE mg/dL    Bilirubin, Urine NEGATIVE NEGATIVE    Urobilinogen, Urine <2.0 <2.0 mg/dL    Nitrite, Urine NEGATIVE NEGATIVE    Leukocyte Esterase, Urine NEGATIVE NEGATIVE   Extra Urine Gray Tube   Result Value Ref Range    Extra Tube Hold for add-ons.    Urinalysis Microscopic   Result Value Ref Range    WBC, Urine 1-5 1-5, NONE /HPF    RBC, Urine >20 (A) NONE, 1-2, 3-5 /HPF    Hyaline Casts, Urine 2+ (A) NONE /LPF   Troponin I, High Sensitivity   Result Value Ref Range    Troponin I, High Sensitivity 55 (HH) 0 - 13 ng/L   Troponin I, High Sensitivity   Result Value Ref Range    Troponin I, High Sensitivity 45 (H) 0 - 13 ng/L   Ammonia   Result Value Ref Range    Ammonia 32 16 - 53 umol/L   CBC   Result Value Ref Range    WBC 8.2 4.4 - 11.3 x10*3/uL    nRBC 0.0 0.0 - 0.0 /100 WBCs    RBC 5.05 4.00 - 5.20 x10*6/uL    Hemoglobin 14.0 12.0 - 16.0 g/dL    Hematocrit 43.1 36.0 - 46.0 %    MCV 85 80 - 100 fL    MCH 27.7 26.0 - 34.0 pg    MCHC 32.5 32.0 - 36.0 g/dL    RDW 12.3  11.5 - 14.5 %    Platelets 229 150 - 450 x10*3/uL    MPV 9.6 7.5 - 11.5 fL   Basic Metabolic Panel   Result Value Ref Range    Glucose 109 (H) 74 - 99 mg/dL    Sodium 139 136 - 145 mmol/L    Potassium 3.9 3.5 - 5.3 mmol/L    Chloride 103 98 - 107 mmol/L    Bicarbonate 26 21 - 32 mmol/L    Anion Gap 14 10 - 20 mmol/L    Urea Nitrogen 24 (H) 6 - 23 mg/dL    Creatinine 1.07 (H) 0.50 - 1.05 mg/dL    eGFR 53 (L) >60 mL/min/1.73m*2    Calcium 9.8 8.6 - 10.3 mg/dL   Magnesium   Result Value Ref Range    Magnesium 2.49 (H) 1.60 - 2.40 mg/dL   ECG 12 lead   Result Value Ref Range    Ventricular Rate 95 BPM    Atrial Rate 95 BPM    NV Interval 162 ms    QRS Duration 110 ms    QT Interval 390 ms    QTC Calculation(Bazett) 490 ms    P Axis 41 degrees    R Axis -65 degrees    T Axis 3 degrees    QRS Count 15 beats    Q Onset 209 ms    P Onset 128 ms    P Offset 202 ms    T Offset 404 ms    QTC Fredericia 454 ms   CBC   Result Value Ref Range    WBC 6.4 4.4 - 11.3 x10*3/uL    nRBC 0.0 0.0 - 0.0 /100 WBCs    RBC 4.60 4.00 - 5.20 x10*6/uL    Hemoglobin 12.8 12.0 - 16.0 g/dL    Hematocrit 40.2 36.0 - 46.0 %    MCV 87 80 - 100 fL    MCH 27.8 26.0 - 34.0 pg    MCHC 31.8 (L) 32.0 - 36.0 g/dL    RDW 12.4 11.5 - 14.5 %    Platelets 213 150 - 450 x10*3/uL    MPV 9.8 7.5 - 11.5 fL   Basic metabolic panel   Result Value Ref Range    Glucose 99 74 - 99 mg/dL    Sodium 141 136 - 145 mmol/L    Potassium 3.5 3.5 - 5.3 mmol/L    Chloride 106 98 - 107 mmol/L    Bicarbonate 27 21 - 32 mmol/L    Anion Gap 12 10 - 20 mmol/L    Urea Nitrogen 21 6 - 23 mg/dL    Creatinine 1.03 0.50 - 1.05 mg/dL    eGFR 55 (L) >60 mL/min/1.73m*2    Calcium 9.0 8.6 - 10.3 mg/dL   Magnesium   Result Value Ref Range    Magnesium 2.37 1.60 - 2.40 mg/dL   Vitamin B12   Result Value Ref Range    Vitamin B12 431 211 - 911 pg/mL   Folate   Result Value Ref Range    Folate, Serum 12.3 >5.0 ng/mL   Vitamin D 25-Hydroxy,Total (for eval of Vitamin D levels)   Result Value Ref  Range    Vitamin D, 25-Hydroxy, Total 37 30 - 100 ng/mL   TSH   Result Value Ref Range    Thyroid Stimulating Hormone 4.19 (H) 0.44 - 3.98 mIU/L   T4, free   Result Value Ref Range    Thyroxine, Free 1.22 (H) 0.61 - 1.12 ng/dL   Transthoracic Echo (TTE) Complete   Result Value Ref Range    LV A4C EF 55.9          Risk Assessment: High risk for suicide and neglect    Impression:  MDD recurrent severe  JONA    Recommendations:    1. Safety: Pt not safe to be discharged home based on our observation and talking to son.     2. Medication Recommendations:   Continue current medication    3. Disposition: [facility] assessment and treatment when medically cleared for discharge    Cannot sign AMA      Thank you for allowing us to participate in the care of this patient.       Anand Garcia MD  10/22/2023  10:06 AM

## 2023-10-22 NOTE — CARE PLAN
Pt continues to have increased anxiety and labile moods.  Pts mentation seems worse today than yesterdays.  A&Ox4 with circumstantial speech.  Mood fluctuated quickly from tearful to smiling and making jokes with staff  She will be admitted to inpatient psych after discharge to stabilize medications and maintain patients safety.  Pt was visited by his son and he is in agreement of plan of care

## 2023-10-23 VITALS
HEART RATE: 70 BPM | TEMPERATURE: 98.8 F | RESPIRATION RATE: 18 BRPM | DIASTOLIC BLOOD PRESSURE: 80 MMHG | SYSTOLIC BLOOD PRESSURE: 143 MMHG | OXYGEN SATURATION: 96 % | HEIGHT: 65 IN | BODY MASS INDEX: 27.49 KG/M2 | WEIGHT: 165 LBS

## 2023-10-23 LAB
ANION GAP SERPL CALC-SCNC: 11 MMOL/L (ref 10–20)
BUN SERPL-MCNC: 19 MG/DL (ref 6–23)
CALCIUM SERPL-MCNC: 9.4 MG/DL (ref 8.6–10.3)
CHLORIDE SERPL-SCNC: 103 MMOL/L (ref 98–107)
CO2 SERPL-SCNC: 30 MMOL/L (ref 21–32)
CREAT SERPL-MCNC: 1.19 MG/DL (ref 0.5–1.05)
ERYTHROCYTE [DISTWIDTH] IN BLOOD BY AUTOMATED COUNT: 12.5 % (ref 11.5–14.5)
GFR SERPL CREATININE-BSD FRML MDRD: 47 ML/MIN/1.73M*2
GLUCOSE SERPL-MCNC: 93 MG/DL (ref 74–99)
HCT VFR BLD AUTO: 41.7 % (ref 36–46)
HGB BLD-MCNC: 13.2 G/DL (ref 12–16)
MAGNESIUM SERPL-MCNC: 2.29 MG/DL (ref 1.6–2.4)
MCH RBC QN AUTO: 27.5 PG (ref 26–34)
MCHC RBC AUTO-ENTMCNC: 31.7 G/DL (ref 32–36)
MCV RBC AUTO: 87 FL (ref 80–100)
NRBC BLD-RTO: 0 /100 WBCS (ref 0–0)
PLATELET # BLD AUTO: 208 X10*3/UL (ref 150–450)
PMV BLD AUTO: 10.2 FL (ref 7.5–11.5)
POTASSIUM SERPL-SCNC: 3.4 MMOL/L (ref 3.5–5.3)
RBC # BLD AUTO: 4.8 X10*6/UL (ref 4–5.2)
SARS-COV-2 RNA RESP QL NAA+PROBE: NOT DETECTED
SODIUM SERPL-SCNC: 141 MMOL/L (ref 136–145)
WBC # BLD AUTO: 7.2 X10*3/UL (ref 4.4–11.3)

## 2023-10-23 PROCEDURE — 2500000001 HC RX 250 WO HCPCS SELF ADMINISTERED DRUGS (ALT 637 FOR MEDICARE OP): Performed by: PSYCHIATRY & NEUROLOGY

## 2023-10-23 PROCEDURE — 2500000001 HC RX 250 WO HCPCS SELF ADMINISTERED DRUGS (ALT 637 FOR MEDICARE OP): Performed by: PHYSICIAN ASSISTANT

## 2023-10-23 PROCEDURE — 96372 THER/PROPH/DIAG INJ SC/IM: CPT | Performed by: NURSE PRACTITIONER

## 2023-10-23 PROCEDURE — 87635 SARS-COV-2 COVID-19 AMP PRB: CPT | Performed by: NURSE PRACTITIONER

## 2023-10-23 PROCEDURE — 1200000002 HC GENERAL ROOM WITH TELEMETRY DAILY

## 2023-10-23 PROCEDURE — 2500000004 HC RX 250 GENERAL PHARMACY W/ HCPCS (ALT 636 FOR OP/ED): Performed by: NURSE PRACTITIONER

## 2023-10-23 PROCEDURE — 83735 ASSAY OF MAGNESIUM: CPT | Performed by: PHYSICIAN ASSISTANT

## 2023-10-23 PROCEDURE — 2500000001 HC RX 250 WO HCPCS SELF ADMINISTERED DRUGS (ALT 637 FOR MEDICARE OP): Performed by: NURSE PRACTITIONER

## 2023-10-23 PROCEDURE — 85027 COMPLETE CBC AUTOMATED: CPT | Performed by: NURSE PRACTITIONER

## 2023-10-23 PROCEDURE — 99232 SBSQ HOSP IP/OBS MODERATE 35: CPT | Performed by: PSYCHIATRY & NEUROLOGY

## 2023-10-23 PROCEDURE — 80048 BASIC METABOLIC PNL TOTAL CA: CPT | Performed by: NURSE PRACTITIONER

## 2023-10-23 PROCEDURE — 36415 COLL VENOUS BLD VENIPUNCTURE: CPT | Performed by: NURSE PRACTITIONER

## 2023-10-23 PROCEDURE — 2500000004 HC RX 250 GENERAL PHARMACY W/ HCPCS (ALT 636 FOR OP/ED): Performed by: PHYSICIAN ASSISTANT

## 2023-10-23 RX ADMIN — Medication 3 MG: at 20:31

## 2023-10-23 RX ADMIN — POLYETHYLENE GLYCOL 3350 17 G: 17 POWDER, FOR SOLUTION ORAL at 13:58

## 2023-10-23 RX ADMIN — MIRTAZAPINE 7.5 MG: 15 TABLET, FILM COATED ORAL at 20:30

## 2023-10-23 RX ADMIN — ROSUVASTATIN CALCIUM 10 MG: 10 TABLET, FILM COATED ORAL at 20:30

## 2023-10-23 RX ADMIN — ENOXAPARIN SODIUM 40 MG: 40 INJECTION SUBCUTANEOUS at 09:00

## 2023-10-23 RX ADMIN — METOPROLOL SUCCINATE 25 MG: 25 TABLET, EXTENDED RELEASE ORAL at 09:56

## 2023-10-23 RX ADMIN — AMLODIPINE BESYLATE 10 MG: 10 TABLET ORAL at 09:55

## 2023-10-23 RX ADMIN — BUPROPION HYDROCHLORIDE 150 MG: 150 TABLET, FILM COATED, EXTENDED RELEASE ORAL at 09:56

## 2023-10-23 RX ADMIN — METOPROLOL SUCCINATE 25 MG: 25 TABLET, EXTENDED RELEASE ORAL at 20:30

## 2023-10-23 RX ADMIN — ICOSAPENT ETHYL 1 G: 1 CAPSULE ORAL at 09:56

## 2023-10-23 ASSESSMENT — COGNITIVE AND FUNCTIONAL STATUS - GENERAL
MOBILITY SCORE: 22
DRESSING REGULAR LOWER BODY CLOTHING: A LITTLE
WALKING IN HOSPITAL ROOM: A LITTLE
HELP NEEDED FOR BATHING: A LITTLE
CLIMB 3 TO 5 STEPS WITH RAILING: A LITTLE
TOILETING: A LITTLE
DAILY ACTIVITIY SCORE: 20
MOBILITY SCORE: 24
DRESSING REGULAR UPPER BODY CLOTHING: A LITTLE

## 2023-10-23 ASSESSMENT — PAIN SCALES - GENERAL
PAINLEVEL_OUTOF10: 0 - NO PAIN
PAINLEVEL_OUTOF10: 0 - NO PAIN

## 2023-10-23 NOTE — PROGRESS NOTES
Charisse Acharya is a 78 y.o. female on day 3 of admission presenting with Altered mental status, unspecified altered mental status type.    Subjective   Feeling little better but remains confused       Objective         Current Facility-Administered Medications:     acetaminophen (Tylenol) tablet 650 mg, 650 mg, oral, q4h PRN **OR** acetaminophen (Tylenol) oral liquid 650 mg, 650 mg, oral, q4h PRN **OR** acetaminophen (Tylenol) suppository 650 mg, 650 mg, rectal, q4h PRN, Carol Mayo PA-C    amLODIPine (Norvasc) tablet 10 mg, 10 mg, oral, Daily, ANIA Jolly-CNP, 10 mg at 10/23/23 0955    buPROPion XL (Wellbutrin XL) 24 hr tablet 150 mg, 150 mg, oral, Daily, Anand Garcia MD, 150 mg at 10/23/23 0956    enoxaparin (Lovenox) syringe 40 mg, 40 mg, subcutaneous, Daily, ANIA Jolly-CNP, 40 mg at 10/23/23 0900    icosapent ethyL (Vascepa) capsule 1 g, 1 capsule, oral, Daily, ANIA Jolly-CNP, 1 g at 10/23/23 0956    melatonin tablet 3 mg, 3 mg, oral, Daily, Carol Mayo PA-C, 3 mg at 10/22/23 2033    metoprolol succinate XL (Toprol-XL) 24 hr tablet 25 mg, 25 mg, oral, BID, CHETAN Jolly, 25 mg at 10/23/23 0956    mirtazapine (Remeron) tablet 7.5 mg, 7.5 mg, oral, Nightly, Anand Garcia MD, 7.5 mg at 10/22/23 2033    pneumococcal conjugate vaccine, 13-valent (PREVNAR 13), 0.5 mL, intramuscular, During hospitalization, Devonte Lopez MD    polyethylene glycol (Glycolax, Miralax) packet 17 g, 17 g, oral, Daily, Carol Mayo PA-C, 17 g at 10/23/23 1358    rosuvastatin (Crestor) tablet 10 mg, 10 mg, oral, Nightly, ANIA Jolly-CNP, 10 mg at 10/22/23 2034       Physical Exam  HENT:      Head: Normocephalic.   Eyes:      Conjunctiva/sclera: Conjunctivae normal.   Cardiovascular:      Rate and Rhythm: Regular rhythm.   Pulmonary:      Breath sounds: Normal breath sounds.   Abdominal:      General: Bowel sounds are normal.      " Palpations: Abdomen is soft.   Musculoskeletal:         General: Normal range of motion.   Skin:     General: Skin is warm and dry.   Neurological:      General: No focal deficit present.      Mental Status: She is alert.   Psychiatric:         Behavior: Behavior normal.           Last Recorded Vitals  Blood pressure 125/70, pulse 63, temperature 36.9 °C (98.4 °F), resp. rate 18, height 1.651 m (5' 5\"), weight 74.8 kg (165 lb), SpO2 100 %.  Intake/Output last 3 Shifts:  I/O last 3 completed shifts:  In: 600 (8 mL/kg) [P.O.:600]  Out: - (0 mL/kg)   Weight: 74.8 kg     Labs:       Results for orders placed or performed during the hospital encounter of 10/19/23 (from the past 24 hour(s))   Magnesium   Result Value Ref Range    Magnesium 2.29 1.60 - 2.40 mg/dL   Basic metabolic panel   Result Value Ref Range    Glucose 93 74 - 99 mg/dL    Sodium 141 136 - 145 mmol/L    Potassium 3.4 (L) 3.5 - 5.3 mmol/L    Chloride 103 98 - 107 mmol/L    Bicarbonate 30 21 - 32 mmol/L    Anion Gap 11 10 - 20 mmol/L    Urea Nitrogen 19 6 - 23 mg/dL    Creatinine 1.19 (H) 0.50 - 1.05 mg/dL    eGFR 47 (L) >60 mL/min/1.73m*2    Calcium 9.4 8.6 - 10.3 mg/dL   CBC   Result Value Ref Range    WBC 7.2 4.4 - 11.3 x10*3/uL    nRBC 0.0 0.0 - 0.0 /100 WBCs    RBC 4.80 4.00 - 5.20 x10*6/uL    Hemoglobin 13.2 12.0 - 16.0 g/dL    Hematocrit 41.7 36.0 - 46.0 %    MCV 87 80 - 100 fL    MCH 27.5 26.0 - 34.0 pg    MCHC 31.7 (L) 32.0 - 36.0 g/dL    RDW 12.5 11.5 - 14.5 %    Platelets 208 150 - 450 x10*3/uL    MPV 10.2 7.5 - 11.5 fL              Assessment/Plan   Active Problems:  There are no active Hospital Problems.  AMS due to encephalopathy  HUNT  Abnormal EKG  Elevated troponin        PLAN: Pt remains little confused, per psych her symptoms could have been due to Wellbutrin, SS working on dc plan, pt is medically stable to in pt psych unit, d/w CNP, appreciate input of Psych, follow closely, I ve coordinated the care.             Devonte Lopez, " MD

## 2023-10-23 NOTE — PROGRESS NOTES
Occupational Therapy                 Therapy Communication Note    Patient Name: Charisse Acharya  MRN: [id number]  Today's Date: 10/23/2023     Discipline: Occupational Therapy    Missed Visit Reason:  OT orders received and chart reviewed. Pt. Waiting for geropsych transfer. Pt. Is agitated and labile, not appropriate for OT eval this date. Will hold and attempt again as medically appropriate.     Missed Time: Attempt    Comment:

## 2023-10-23 NOTE — PROGRESS NOTES
Spiritual Care Visit    Clinical Encounter Type  Visited With: Patient  Routine Visit: Introduction  Continue Visiting: Yes         Values/Beliefs  Cultural Requests During Hospitalization: Patient had questions aboput prayer times                                  Taxonomy  Intended Effects: Aligning care plan with patient's values, Build relationship of care and support, Helping someone feel comforted, Establish rapport and connectedness  Methods: Offer support

## 2023-10-23 NOTE — PROGRESS NOTES
"Charisse Acharya is a 78 y.o. female on day 3 of admission presenting with Altered mental status, unspecified altered mental status type.    SUBJECTIVE:  Pt remain confused with significant impairment in recent memory. Pt continue to feel depressed and overwhelmed, anxious  Slept slightly better  Still has hopeless and helpless feeling  Pt continue to feel burden to the family    Exam:  Vital Signs: /84   Pulse 77   Temp 36.7 °C (98.1 °F)   Resp 18   Ht 1.651 m (5' 5\")   Wt 74.8 kg (165 lb)   SpO2 100%   BMI 27.46 kg/m²   Musculoskeletal: Muscle tone: WNL  Gait/Station: normal      Mental Status Exam:  General Appearance: well built  Speech: incoherent  Mood: Depressed and anxious  Affect: emotionally labile  Thought Process: flights of ideas  Thought Associations: No loosening of associations  Thought Content: suicidal- fleeting, hopeless and worthless feeling  Perception: No perceptual abnormalities noted  Level of Consciousness: Alert  Orientation: Alert and oriented to person, place, time and situation  Attention and Concentration: Mild impairment in attention  Cognition:Impaired recent memory  Insight: fair  Judgment: fair     Results for orders placed or performed during the hospital encounter of 10/19/23 (from the past 96 hour(s))   CBC with Differential   Result Value Ref Range    WBC 7.2 4.4 - 11.3 x10*3/uL    nRBC 0.0 0.0 - 0.0 /100 WBCs    RBC 5.22 (H) 4.00 - 5.20 x10*6/uL    Hemoglobin 14.5 12.0 - 16.0 g/dL    Hematocrit 44.2 36.0 - 46.0 %    MCV 85 80 - 100 fL    MCH 27.8 26.0 - 34.0 pg    MCHC 32.8 32.0 - 36.0 g/dL    RDW 12.0 11.5 - 14.5 %    Platelets 198 150 - 450 x10*3/uL    MPV 9.7 7.5 - 11.5 fL    Neutrophils % 75.7 40.0 - 80.0 %    Immature Granulocytes %, Automated 0.1 0.0 - 0.9 %    Lymphocytes % 16.3 13.0 - 44.0 %    Monocytes % 7.3 2.0 - 10.0 %    Eosinophils % 0.3 0.0 - 6.0 %    Basophils % 0.3 0.0 - 2.0 %    Neutrophils Absolute 5.42 1.60 - 5.50 x10*3/uL    Immature " Granulocytes Absolute, Automated 0.01 0.00 - 0.50 x10*3/uL    Lymphocytes Absolute 1.17 0.80 - 3.00 x10*3/uL    Monocytes Absolute 0.52 0.05 - 0.80 x10*3/uL    Eosinophils Absolute 0.02 0.00 - 0.40 x10*3/uL    Basophils Absolute 0.02 0.00 - 0.10 x10*3/uL   Comprehensive Metabolic Panel   Result Value Ref Range    Glucose 122 (H) 74 - 99 mg/dL    Sodium 139 136 - 145 mmol/L    Potassium 3.6 3.5 - 5.3 mmol/L    Chloride 100 98 - 107 mmol/L    Bicarbonate 25 21 - 32 mmol/L    Anion Gap 18 10 - 20 mmol/L    Urea Nitrogen 17 6 - 23 mg/dL    Creatinine 1.03 0.50 - 1.05 mg/dL    eGFR 55 (L) >60 mL/min/1.73m*2    Calcium 9.8 8.6 - 10.3 mg/dL    Albumin 5.0 3.4 - 5.0 g/dL    Alkaline Phosphatase 81 33 - 136 U/L    Total Protein 7.0 6.4 - 8.2 g/dL    AST 20 9 - 39 U/L    Bilirubin, Total 1.0 0.0 - 1.2 mg/dL    ALT 15 7 - 45 U/L   TSH with reflex to Free T4 if abnormal   Result Value Ref Range    Thyroid Stimulating Hormone 3.68 0.44 - 3.98 mIU/L   Acute Toxicology Panel, Blood   Result Value Ref Range    Acetaminophen <10.0 10.0 - 30.0 ug/mL    Salicylate  <3 4 - 20 mg/dL    Alcohol <10 <=10 mg/dL   Magnesium   Result Value Ref Range    Magnesium 2.39 1.60 - 2.40 mg/dL   Troponin I, High Sensitivity, Initial   Result Value Ref Range    Troponin I, High Sensitivity 33 (H) 0 - 13 ng/L   Drug Screen, Urine   Result Value Ref Range    Amphetamine Screen, Urine Presumptive Negative Presumptive Negative    Barbiturate Screen, Urine Presumptive Negative Presumptive Negative    Benzodiazepines Screen, Urine Presumptive Negative Presumptive Negative    Cannabinoid Screen, Urine Presumptive Negative Presumptive Negative    Cocaine Metabolite Screen, Urine Presumptive Negative Presumptive Negative    Fentanyl Screen, Urine Presumptive Negative Presumptive Negative    Opiate Screen, Urine Presumptive Negative Presumptive Negative    Oxycodone Screen, Urine Presumptive Negative Presumptive Negative    PCP Screen, Urine Presumptive  Negative Presumptive Negative   Urinalysis with Reflex Microscopic and Culture   Result Value Ref Range    Color, Urine Yellow Straw, Yellow    Appearance, Urine Hazy (N) Clear    Specific Gravity, Urine 1.018 1.005 - 1.035    pH, Urine 6.0 5.0, 5.5, 6.0, 6.5, 7.0, 7.5, 8.0    Protein, Urine 100 (2+) (N) NEGATIVE mg/dL    Glucose, Urine NEGATIVE NEGATIVE mg/dL    Blood, Urine SMALL (1+) (A) NEGATIVE    Ketones, Urine 20 (1+) (A) NEGATIVE mg/dL    Bilirubin, Urine NEGATIVE NEGATIVE    Urobilinogen, Urine <2.0 <2.0 mg/dL    Nitrite, Urine NEGATIVE NEGATIVE    Leukocyte Esterase, Urine NEGATIVE NEGATIVE   Extra Urine Gray Tube   Result Value Ref Range    Extra Tube Hold for add-ons.    Urinalysis Microscopic   Result Value Ref Range    WBC, Urine 1-5 1-5, NONE /HPF    RBC, Urine >20 (A) NONE, 1-2, 3-5 /HPF    Hyaline Casts, Urine 2+ (A) NONE /LPF   Troponin I, High Sensitivity   Result Value Ref Range    Troponin I, High Sensitivity 55 (HH) 0 - 13 ng/L   Troponin I, High Sensitivity   Result Value Ref Range    Troponin I, High Sensitivity 45 (H) 0 - 13 ng/L   Ammonia   Result Value Ref Range    Ammonia 32 16 - 53 umol/L   CBC   Result Value Ref Range    WBC 8.2 4.4 - 11.3 x10*3/uL    nRBC 0.0 0.0 - 0.0 /100 WBCs    RBC 5.05 4.00 - 5.20 x10*6/uL    Hemoglobin 14.0 12.0 - 16.0 g/dL    Hematocrit 43.1 36.0 - 46.0 %    MCV 85 80 - 100 fL    MCH 27.7 26.0 - 34.0 pg    MCHC 32.5 32.0 - 36.0 g/dL    RDW 12.3 11.5 - 14.5 %    Platelets 229 150 - 450 x10*3/uL    MPV 9.6 7.5 - 11.5 fL   Basic Metabolic Panel   Result Value Ref Range    Glucose 109 (H) 74 - 99 mg/dL    Sodium 139 136 - 145 mmol/L    Potassium 3.9 3.5 - 5.3 mmol/L    Chloride 103 98 - 107 mmol/L    Bicarbonate 26 21 - 32 mmol/L    Anion Gap 14 10 - 20 mmol/L    Urea Nitrogen 24 (H) 6 - 23 mg/dL    Creatinine 1.07 (H) 0.50 - 1.05 mg/dL    eGFR 53 (L) >60 mL/min/1.73m*2    Calcium 9.8 8.6 - 10.3 mg/dL   Magnesium   Result Value Ref Range    Magnesium 2.49 (H) 1.60 -  2.40 mg/dL   ECG 12 lead   Result Value Ref Range    Ventricular Rate 95 BPM    Atrial Rate 95 BPM    NM Interval 162 ms    QRS Duration 110 ms    QT Interval 390 ms    QTC Calculation(Bazett) 490 ms    P Axis 41 degrees    R Axis -65 degrees    T Axis 3 degrees    QRS Count 15 beats    Q Onset 209 ms    P Onset 128 ms    P Offset 202 ms    T Offset 404 ms    QTC Fredericia 454 ms   CBC   Result Value Ref Range    WBC 6.4 4.4 - 11.3 x10*3/uL    nRBC 0.0 0.0 - 0.0 /100 WBCs    RBC 4.60 4.00 - 5.20 x10*6/uL    Hemoglobin 12.8 12.0 - 16.0 g/dL    Hematocrit 40.2 36.0 - 46.0 %    MCV 87 80 - 100 fL    MCH 27.8 26.0 - 34.0 pg    MCHC 31.8 (L) 32.0 - 36.0 g/dL    RDW 12.4 11.5 - 14.5 %    Platelets 213 150 - 450 x10*3/uL    MPV 9.8 7.5 - 11.5 fL   Basic metabolic panel   Result Value Ref Range    Glucose 99 74 - 99 mg/dL    Sodium 141 136 - 145 mmol/L    Potassium 3.5 3.5 - 5.3 mmol/L    Chloride 106 98 - 107 mmol/L    Bicarbonate 27 21 - 32 mmol/L    Anion Gap 12 10 - 20 mmol/L    Urea Nitrogen 21 6 - 23 mg/dL    Creatinine 1.03 0.50 - 1.05 mg/dL    eGFR 55 (L) >60 mL/min/1.73m*2    Calcium 9.0 8.6 - 10.3 mg/dL   Magnesium   Result Value Ref Range    Magnesium 2.37 1.60 - 2.40 mg/dL   Vitamin B12   Result Value Ref Range    Vitamin B12 431 211 - 911 pg/mL   Folate   Result Value Ref Range    Folate, Serum 12.3 >5.0 ng/mL   Vitamin D 25-Hydroxy,Total (for eval of Vitamin D levels)   Result Value Ref Range    Vitamin D, 25-Hydroxy, Total 37 30 - 100 ng/mL   TSH   Result Value Ref Range    Thyroid Stimulating Hormone 4.19 (H) 0.44 - 3.98 mIU/L   T4, free   Result Value Ref Range    Thyroxine, Free 1.22 (H) 0.61 - 1.12 ng/dL   Transthoracic Echo (TTE) Complete   Result Value Ref Range    LV A4C EF 55.9    CBC   Result Value Ref Range    WBC 6.0 4.4 - 11.3 x10*3/uL    nRBC 0.0 0.0 - 0.0 /100 WBCs    RBC 4.88 4.00 - 5.20 x10*6/uL    Hemoglobin 13.5 12.0 - 16.0 g/dL    Hematocrit 42.4 36.0 - 46.0 %    MCV 87 80 - 100 fL    MCH  27.7 26.0 - 34.0 pg    MCHC 31.8 (L) 32.0 - 36.0 g/dL    RDW 12.4 11.5 - 14.5 %    Platelets 214 150 - 450 x10*3/uL    MPV 9.7 7.5 - 11.5 fL   Basic metabolic panel   Result Value Ref Range    Glucose 79 74 - 99 mg/dL    Sodium 142 136 - 145 mmol/L    Potassium 3.3 (L) 3.5 - 5.3 mmol/L    Chloride 104 98 - 107 mmol/L    Bicarbonate 29 21 - 32 mmol/L    Anion Gap 12 10 - 20 mmol/L    Urea Nitrogen 23 6 - 23 mg/dL    Creatinine 1.10 (H) 0.50 - 1.05 mg/dL    eGFR 51 (L) >60 mL/min/1.73m*2    Calcium 9.3 8.6 - 10.3 mg/dL   Magnesium   Result Value Ref Range    Magnesium 2.23 1.60 - 2.40 mg/dL   Magnesium   Result Value Ref Range    Magnesium 2.29 1.60 - 2.40 mg/dL   Basic metabolic panel   Result Value Ref Range    Glucose 93 74 - 99 mg/dL    Sodium 141 136 - 145 mmol/L    Potassium 3.4 (L) 3.5 - 5.3 mmol/L    Chloride 103 98 - 107 mmol/L    Bicarbonate 30 21 - 32 mmol/L    Anion Gap 11 10 - 20 mmol/L    Urea Nitrogen 19 6 - 23 mg/dL    Creatinine 1.19 (H) 0.50 - 1.05 mg/dL    eGFR 47 (L) >60 mL/min/1.73m*2    Calcium 9.4 8.6 - 10.3 mg/dL   CBC   Result Value Ref Range    WBC 7.2 4.4 - 11.3 x10*3/uL    nRBC 0.0 0.0 - 0.0 /100 WBCs    RBC 4.80 4.00 - 5.20 x10*6/uL    Hemoglobin 13.2 12.0 - 16.0 g/dL    Hematocrit 41.7 36.0 - 46.0 %    MCV 87 80 - 100 fL    MCH 27.5 26.0 - 34.0 pg    MCHC 31.7 (L) 32.0 - 36.0 g/dL    RDW 12.5 11.5 - 14.5 %    Platelets 208 150 - 450 x10*3/uL    MPV 10.2 7.5 - 11.5 fL         Risk Assessment: High risk for suicide and neglect    Impression:  MDD recurrent severe  JONA    Recommendations:    1. Safety: Pt not safe to be discharged home based on our observation and talking to son.     2. Medication Recommendations:   Continue current medication    3. Disposition: [facility] and treatment when medically cleared for discharge    Cannot sign AMA      Thank you for allowing us to participate in the care of this patient.       Anand Garcia,  MD  10/23/2023  4:55 PM

## 2023-10-23 NOTE — SIGNIFICANT EVENT
Application for Emergency Admission      Ready for Transfer?  Is the patient medically cleared for transfer to inpatient psychiatry: Yes  Has the patient been accepted to an inpatient psychiatric hospital: Yes    Application for Emergency Admission  IN ACCORDANCE WITH [place] 5122[place]10 [place]  The Chief Clinical Officer of: [facility] Earl Cantrell 10/23/2023 .5:46 PM    Reason for Hospitalization  The undersigned has reason to believe that: Charisse Acharya Is a mentally ill person subject to hospitalization by court order under division B Section 5122.01 of the Revised Code, i.e., this person:    1.Yes  Represents a substantial risk of physical harm to self as manifested by evidence of threats of, or attempts at, suicide or serious self-inflicted bodily harm    2.No Represents a substantial risk of physical harm to others as manifested by evidence of recent homicidal or other violent behavior, evidence of recent threats that place another in reasonable fear of violent behavior and serious physical harm, or other evidence of present dangerousness    3.Yes Represents a substantial and immediate risk of serious physical impairment or injury to self as manifested by  evidence that the person is unable to provide for and is not providing for the person's basic physical needs because of the person's mental illness and that appropriate provision for those needs cannot be made  immediately available in the community    4.No Would benefit from treatment in a hospital for his mental illness and is in need of such treatment as manifested by evidence of behavior that creates a grave and imminent risk to substantial rights of others or  himself.    5.No Would benefit from treatment as manifested by evidence of behavior that indicates all of the following:       (a) The person is unlikely to survive safely in the community without supervision, based on a clinical determination.       (b) The person has a history of lack of  compliance with treatment for mental illness and one of the following applies:      (i) At least twice within the thirty-six months prior to the filing of an affidavit seeking court-ordered treatment of the person under section 5122.111 of the Revised Code, the lack of compliance has been a significant factor in necessitating hospitalization in a hospital or receipt of services in a forensic or other mental health unit of a correctional facility, provided that the thirty-six-month period shall be extended by the length of any hospitalization or incarceration of the person that occurred within the thirty-six-month period.      (ii) Within the forty-eight months prior to the filing of an affidavit seeking court-ordered treatment of the person under section 5122.111 of the Revised Code, the lack of compliance resulted in one or more acts of serious violent behavior toward self or others or threats of, or attempts at, serious physical harm to self or others, provided that the forty-eight-month period shall be extended by the length of any hospitalization or incarceration of the person that occurred within the forty-eight-month period.      (c) The person, as a result of mental illness, is unlikely to voluntarily participate in necessary treatment.       (d) In view of the person's treatment history and current behavior, the person is in need of treatment in order to prevent a relapse or deterioration that would be likely to result in substantial risk of serious harm to the person or others.    (e) Represents a substantial risk of physical harm to self or others if allowed to remain at liberty pending examination.    Therefore, it is requested that said person be admitted to the above named facility.    STATEMENT OF BELIEF    Must be filled out by one of the following: a psychiatrist, licensed physician, licensed clinical psychologist, health or ,  or .  (Statement shall include the  circumstances under which the individual was taken into custody and the reason for the person's belief that hospitalization is necessary. The statement shall also include a reference to efforts made to secure the individual's property at his residence if he was taken into custody there. Every reasonable and appropriate effort should be made to take this person into custody in the least conspicuous manner possible.)    Pt is clinically depressed and suicidal with hopeless and worthless feeling. Will benefit from assessment and treatment     Anand Garcia MD 10/23/2023     _____________________________________________________________   Place of Employment: [facility] [place]    STATEMENT OF OBSERVATION BY PSYCHIATRIST, LICENSED PHYSICIAN, OR LICENSED CLINICAL PSYCHOLOGIST, IF APPLICABLE    Place of Observation (e.g., [facility], general hospital, office, emergency facility)  (If applicable, please complete)    Anand Garcia MD 10/23/2023    _____________________________________________________________
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
## 2023-10-23 NOTE — CARE PLAN
The patient's goals for the shift include get a good night sleep    The clinical goals for the shift include Pt will have update on [facility] placement 10/23/23    Pt has placement pending covid sample

## 2023-10-24 LAB
ATRIAL RATE: 98 BPM
P AXIS: 68 DEGREES
P OFFSET: 197 MS
P ONSET: 137 MS
PR INTERVAL: 148 MS
Q ONSET: 211 MS
QRS COUNT: 15 BEATS
QRS DURATION: 106 MS
QT INTERVAL: 382 MS
QTC CALCULATION(BAZETT): 487 MS
QTC FREDERICIA: 450 MS
R AXIS: -73 DEGREES
T AXIS: 20 DEGREES
T OFFSET: 402 MS
VENTRICULAR RATE: 98 BPM

## 2023-10-25 ENCOUNTER — HOSPITAL ENCOUNTER (OUTPATIENT)
Dept: CARDIOLOGY | Facility: HOSPITAL | Age: 78
Discharge: HOME | End: 2023-10-25
Payer: MEDICARE

## 2023-10-25 LAB — VIT B1 PYROPHOSHATE BLD-SCNC: 136 NMOL/L (ref 70–180)

## 2023-10-25 PROCEDURE — 93005 ELECTROCARDIOGRAM TRACING: CPT

## 2023-10-28 NOTE — DISCHARGE SUMMARY
Discharge Diagnosis  Altered mental status, unspecified altered mental status type    Issues Requiring Follow-Up  AMS    Discharge Meds     Your medication list        START taking these medications        Instructions Last Dose Given Next Dose Due   mirtazapine 7.5 mg tablet  Commonly known as: Remeron      Take 1 tablet (7.5 mg) by mouth once daily at bedtime.              CHANGE how you take these medications        Instructions Last Dose Given Next Dose Due   buPROPion  mg 24 hr tablet  Commonly known as: Wellbutrin XL  What changed:   medication strength  how much to take      Take 1 tablet (150 mg) by mouth once daily. Do not crush, chew, or split. Do not start before October 23, 2023.              CONTINUE taking these medications        Instructions Last Dose Given Next Dose Due   amLODIPine 10 mg tablet  Commonly known as: Norvasc           cranberry 450 mg tablet  Generic drug: cranberry fruit           estradiol 0.01 % (0.1 mg/gram) vaginal cream  Commonly known as: Estrace           Fish OiL 1,000 mg (120 mg-180 mg) capsule  Generic drug: omega 3-dha-epa-fish oil           metoprolol succinate XL 25 mg 24 hr tablet  Commonly known as: Toprol-XL           rosuvastatin 10 mg tablet  Commonly known as: Crestor                  STOP taking these medications      venlafaxine XR 37.5 mg 24 hr capsule  Commonly known as: Effexor-XR                  Where to Get Your Medications        These medications were sent to [id number] - [address] [address]      Phone: [phone]   buPROPion  mg 24 hr tablet  mirtazapine 7.5 mg tablet         Test Results Pending At Discharge  Pending Labs       No current pending labs.            Hospital Course   uneventful    Pertinent Physical Exam At Time of Discharge  Physical Exam    Outpatient Follow-Up  Future Appointments   Date Time Provider Department Center   10/18/2024  1:00 PM Michael M  [id number] [place]         Devonte Lopez MD

## 2024-02-03 ENCOUNTER — LAB REQUISITION (OUTPATIENT)
Dept: LAB | Facility: HOSPITAL | Age: 79
End: 2024-02-03
Payer: MEDICARE

## 2024-02-03 DIAGNOSIS — R30.0 DYSURIA: ICD-10-CM

## 2024-02-03 LAB
APPEARANCE UR: ABNORMAL
BILIRUB UR STRIP.AUTO-MCNC: NEGATIVE MG/DL
COLOR UR: YELLOW
GLUCOSE UR STRIP.AUTO-MCNC: NEGATIVE MG/DL
KETONES UR STRIP.AUTO-MCNC: NEGATIVE MG/DL
LEUKOCYTE ESTERASE UR QL STRIP.AUTO: ABNORMAL
MUCOUS THREADS #/AREA URNS AUTO: ABNORMAL /LPF
NITRITE UR QL STRIP.AUTO: NEGATIVE
PH UR STRIP.AUTO: 6 [PH]
PROT UR STRIP.AUTO-MCNC: NEGATIVE MG/DL
RBC # UR STRIP.AUTO: ABNORMAL /UL
RBC #/AREA URNS AUTO: ABNORMAL /HPF
SP GR UR STRIP.AUTO: 1.01
UROBILINOGEN UR STRIP.AUTO-MCNC: <2 MG/DL
WBC #/AREA URNS AUTO: >50 /HPF
WBC CLUMPS #/AREA URNS AUTO: ABNORMAL /HPF

## 2024-02-03 PROCEDURE — 81001 URINALYSIS AUTO W/SCOPE: CPT

## 2024-10-18 ENCOUNTER — APPOINTMENT (OUTPATIENT)
Dept: ORTHOPEDIC SURGERY | Facility: CLINIC | Age: 79
End: 2024-10-18
Payer: MEDICARE

## 2024-10-24 ENCOUNTER — APPOINTMENT (OUTPATIENT)
Dept: ORTHOPEDIC SURGERY | Facility: CLINIC | Age: 79
End: 2024-10-24
Payer: MEDICARE